# Patient Record
Sex: FEMALE | Race: WHITE | Employment: PART TIME
[De-identification: names, ages, dates, MRNs, and addresses within clinical notes are randomized per-mention and may not be internally consistent; named-entity substitution may affect disease eponyms.]

---

## 2017-06-17 ENCOUNTER — HEALTH MAINTENANCE LETTER (OUTPATIENT)
Age: 57
End: 2017-06-17

## 2017-11-14 ENCOUNTER — TRANSFERRED RECORDS (OUTPATIENT)
Dept: HEALTH INFORMATION MANAGEMENT | Facility: CLINIC | Age: 57
End: 2017-11-14

## 2017-11-14 LAB
ALT SERPL-CCNC: 13 U/L (ref 6–29)
AST SERPL-CCNC: 16 U/L (ref 10–35)
CHOLEST SERPL-MCNC: 256 MG/DL
CREAT SERPL-MCNC: 0.88 MG/DL (ref 0.5–1.05)
GFR SERPL CREATININE-BSD FRML MDRD: 73 ML/MIN/1.73M2
GLUCOSE SERPL-MCNC: 88 MG/DL (ref 65–99)
HDLC SERPL-MCNC: 73 MG/DL
LDLC SERPL CALC-MCNC: 163 MG/DL
NONHDLC SERPL-MCNC: 183 MG/DL
POTASSIUM SERPL-SCNC: 4.6 MMOL/L (ref 3.5–5.3)
TRIGL SERPL-MCNC: 94 MG/DL

## 2017-12-08 ENCOUNTER — RADIANT APPOINTMENT (OUTPATIENT)
Dept: MAMMOGRAPHY | Facility: CLINIC | Age: 57
End: 2017-12-08
Attending: OBSTETRICS & GYNECOLOGY
Payer: COMMERCIAL

## 2017-12-08 DIAGNOSIS — Z12.31 VISIT FOR SCREENING MAMMOGRAM: ICD-10-CM

## 2017-12-08 PROCEDURE — G0202 SCR MAMMO BI INCL CAD: HCPCS | Mod: TC

## 2018-02-15 ENCOUNTER — OFFICE VISIT (OUTPATIENT)
Dept: DERMATOLOGY | Facility: CLINIC | Age: 58
End: 2018-02-15
Payer: COMMERCIAL

## 2018-02-15 ENCOUNTER — TELEPHONE (OUTPATIENT)
Dept: DERMATOLOGY | Facility: CLINIC | Age: 58
End: 2018-02-15

## 2018-02-15 VITALS — HEART RATE: 91 BPM | OXYGEN SATURATION: 100 % | SYSTOLIC BLOOD PRESSURE: 134 MMHG | DIASTOLIC BLOOD PRESSURE: 93 MMHG

## 2018-02-15 DIAGNOSIS — D04.39 SQUAMOUS CELL CARCINOMA IN SITU OF SKIN OF FOREHEAD: Primary | ICD-10-CM

## 2018-02-15 DIAGNOSIS — L81.4 LENTIGO: ICD-10-CM

## 2018-02-15 DIAGNOSIS — D23.9 DERMAL NEVUS: ICD-10-CM

## 2018-02-15 DIAGNOSIS — C44.329 SQUAMOUS CELL CARCINOMA OF FOREHEAD: ICD-10-CM

## 2018-02-15 DIAGNOSIS — L82.1 SK (SEBORRHEIC KERATOSIS): ICD-10-CM

## 2018-02-15 PROCEDURE — 11100 HC BIOPSY SKIN/SUBQ/MUC MEM, SINGLE LESION: CPT | Performed by: DERMATOLOGY

## 2018-02-15 PROCEDURE — 99203 OFFICE O/P NEW LOW 30 MIN: CPT | Mod: 25 | Performed by: DERMATOLOGY

## 2018-02-15 PROCEDURE — 88331 PATH CONSLTJ SURG 1 BLK 1SPC: CPT | Performed by: DERMATOLOGY

## 2018-02-15 NOTE — PROGRESS NOTES
Sosa Seay is a 57 year old year old female patient here today for non healing spot on forehead for years .  Patient states this has been present for years.  Patient reports the following symptoms:  Not healing.  .  Patient reports the following previous treatments none.  Patient reports the following modifying factors none.  Associated symptoms: none.  Patient has no other skin complaints today.  Remainder of the HPI, Meds, PMH, Allergies, FH, and SH was reviewed in chart.      Past Medical History:   Diagnosis Date     Allergic rhinitis, cause unspecified     Allergic rhinitis       Past Surgical History:   Procedure Laterality Date     C NONSPECIFIC PROCEDURE      Reconstructive Surgery to Ears        Family History   Problem Relation Age of Onset     Hypertension Father      Skin Cancer Father      Depression Mother       age 59 depression killed self     Skin Cancer Paternal Grandmother        Social History     Social History     Marital status:      Spouse name: Elliott     Number of children: 0     Years of education: 16     Occupational History           food [employer]     Social History Main Topics     Smoking status: Never Smoker     Smokeless tobacco: Never Used     Alcohol use Yes      Comment: 3-4 glasses of wine per week     Drug use: No     Sexual activity: Yes     Partners: Male     Birth control/ protection: Surgical      Comment: Vasectomy     Other Topics Concern     Not on file     Social History Narrative       Outpatient Encounter Prescriptions as of 2/15/2018   Medication Sig Dispense Refill     CALCIUM + D PO None Entered       VALTREX 1 GM PO TABS 1 TABLET 3 TIMES DAILY 21 Tab 0     MULTIPLE VITAMINS CAPS   OR 1 capsule qd        CALCIUM 500 500 MG OR TABS 1 qd prn       No facility-administered encounter medications on file as of 2/15/2018.              Review Of Systems  Skin: As above  Eyes: negative  Ears/Nose/Throat: negative  Respiratory: No  shortness of breath, dyspnea on exertion, cough, or hemoptysis  Cardiovascular: negative  Gastrointestinal: negative  Genitourinary: negative  Musculoskeletal: negative  Neurologic: negative  Psychiatric: negative  Hematologic/Lymphatic/Immunologic: negative  Endocrine: negative      O:   NAD, WDWN, Alert & Oriented, Mood & Affect wnl, Vitals stable   Here today alone   BP (!) 134/93  Pulse 91  LMP 01/04/2012  SpO2 100%   General appearance normal   Vitals stable   Alert, oriented and in no acute distress      Following lymph nodes palpated: Occipital, Cervical, Supraclavicular no lad   Stuck on papules and brown macules on trunk and ext    Flesh colored papules on trunk and ext    r forehead 9mm eroded scaly papule           The remainder of the full exam was unremarkable; the following areas were examined:  conjunctiva/lids, oral mucosa, neck, peripheral vascular system, abdomen, lymph nodes, digits/nails, eccrine and apocrine glands, scalp/hair, face, neck, chest, abdomen, buttocks, back, RUE, LUE, RLE, LLE       Eyes: Conjunctivae/lids:Normal     ENT: Lips, buccal mucosa, tongue: normal    MSK:Normal    Cardiovascular: peripheral edema none    Pulm: Breathing Normal    Lymph Nodes: No Head and Neck Lymphadenopathy     Neuro/Psych: Orientation:Normal; Mood/Affect:Normal      MICRO:   R forehead:There is hyperkeratosis & parakeratosis of the epidermis, with full thickness epidermal involvement by atypical keratinocytes with rare pale vacuolated cells invading dermis.    A/P:  1. R forehead r/o squamous cell carcinoma   TANGENTIAL BIOPSY IN HOUSE:  After consent, anesthesia with LEC and prep, tangential excision performed and dx above confirmed with frozen section histology.  No complications and routine wound care.  Patient told result squamous cell carcinoma in situ/ squamous cell carcinoma schedule .    2. Seborrheic keratosis, letnigo, dermal nevus      BENIGN LESIONS DISCUSSED WITH PATIENT:  I discussed the  specifics of tumor, prognosis, and genetics of benign lesions.  I explained that treatment of these lesions would be purely cosmetic and not medically neccessary.  I discussed with patient different removal options including excision, cautery and /or laser.      Nature and genetics of benign skin lesions dicussed with patient.  Signs and Symptoms of skin cancer discussed with patient.  Patient encouraged to perform monthly skin exams.  UV precautions reviewed with patient.  Skin care regimen reviewed with patient: Eliminate harsh soaps, i.e. Dial, zest, irsih spring; Mild soaps such as Cetaphil or Dove sensitive skin, avoid hot or cold showers, aggressive use of emollients including vanicream, cetaphil or cerave discussed with patient.    Risks of non-melanoma skin cancer discussed with patient   Return to clinic next appt

## 2018-02-15 NOTE — LETTER
[facility]  [address]  [address]  [phone]        2/16/2018       Sosa Seay  [address] [address] [address]      Dear Sosa:    You are scheduled for Mohs Surgery on: Thursday, April 5th 2018 at 7:00am .    Please check in at [facility], [address].     You don't need to arrive more than 5-10 minutes prior to your appointment time.     Be sure to eat a good breakfast and bathe and wash your hair prior to surgery.     If you are taking any anti-coagulants that are prescribed by your Doctor (such as Coumadin/Warfarin, Plavix, Aspirin, Ibuprofen), please continue taking them.     However, if you are taking anti-coagulants over the counter without a Doctor's order for a medical condition, please discontinue them 10 days prior to surgery.   Please have a  to and from the clinic  Please wear loose comfortable clothing as it could possibly be 4-6 hours until your surgery is completed depending upon how many layers of tissue need to be removed.      Thank you,    POPEYE Rajan MD

## 2018-02-15 NOTE — TELEPHONE ENCOUNTER
Juaquin Rajan MD  [facility]                   R forehead squamous cell carcinoma in situ / squamous cell carcinoma schedule excision

## 2018-02-15 NOTE — LETTER
2/15/2018         RE: Sosa Seay  [address] [facility] [address]        Dear Colleague,    Thank you for referring your patient, Sosa Seay, to the [facility]. Please see a copy of my visit note below.    Sosa Seay is a 57 year old year old female patient here today for non healing spot on forehead for years .  Patient states this has been present for years.  Patient reports the following symptoms:  Not healing.  .  Patient reports the following previous treatments none.  Patient reports the following modifying factors none.  Associated symptoms: none.  Patient has no other skin complaints today.  Remainder of the HPI, Meds, PMH, Allergies, FH, and SH was reviewed in chart.      Past Medical History:   Diagnosis Date     Allergic rhinitis, cause unspecified     Allergic rhinitis       Past Surgical History:   Procedure Laterality Date     C NONSPECIFIC PROCEDURE      Reconstructive Surgery to Ears        Family History   Problem Relation Age of Onset     Hypertension Father      Skin Cancer Father      Depression Mother       age 59 depression killed self     Skin Cancer Paternal Grandmother        Social History     Social History     Marital status:      Spouse name: Elliott     Number of children: 0     Years of education: 16     Occupational History           food [employer]     Social History Main Topics     Smoking status: Never Smoker     Smokeless tobacco: Never Used     Alcohol use Yes      Comment: 3-4 glasses of wine per week     Drug use: No     Sexual activity: Yes     Partners: Male     Birth control/ protection: Surgical      Comment: Vasectomy     Other Topics Concern     Not on file     Social History Narrative       Outpatient Encounter Prescriptions as of 2/15/2018   Medication Sig Dispense Refill     CALCIUM + D PO None Entered       VALTREX 1 GM PO TABS 1 TABLET 3 TIMES DAILY 21 Tab 0     MULTIPLE  VITAMINS CAPS   OR 1 capsule qd        CALCIUM 500 500 MG OR TABS 1 qd prn       No facility-administered encounter medications on file as of 2/15/2018.              Review Of Systems  Skin: As above  Eyes: negative  Ears/Nose/Throat: negative  Respiratory: No shortness of breath, dyspnea on exertion, cough, or hemoptysis  Cardiovascular: negative  Gastrointestinal: negative  Genitourinary: negative  Musculoskeletal: negative  Neurologic: negative  Psychiatric: negative  Hematologic/Lymphatic/Immunologic: negative  Endocrine: negative      O:   NAD, WDWN, Alert & Oriented, Mood & Affect wnl, Vitals stable   Here today alone   BP (!) 134/93  Pulse 91  LMP 01/04/2012  SpO2 100%   General appearance normal   Vitals stable   Alert, oriented and in no acute distress      Following lymph nodes palpated: Occipital, Cervical, Supraclavicular no lad   Stuck on papules and brown macules on trunk and ext    Flesh colored papules on trunk and ext    r forehead 9mm eroded scaly papule           The remainder of the full exam was unremarkable; the following areas were examined:  conjunctiva/lids, oral mucosa, neck, peripheral vascular system, abdomen, lymph nodes, digits/nails, eccrine and apocrine glands, scalp/hair, face, neck, chest, abdomen, buttocks, back, RUE, LUE, RLE, LLE       Eyes: Conjunctivae/lids:Normal     ENT: Lips, buccal mucosa, tongue: normal    MSK:Normal    Cardiovascular: peripheral edema none    Pulm: Breathing Normal    Lymph Nodes: No Head and Neck Lymphadenopathy     Neuro/Psych: Orientation:Normal; Mood/Affect:Normal      MICRO:   R forehead:There is hyperkeratosis & parakeratosis of the epidermis, with full thickness epidermal involvement by atypical keratinocytes with rare pale vacuolated cells invading dermis.    A/P:  1. R forehead r/o squamous cell carcinoma   TANGENTIAL BIOPSY IN HOUSE:  After consent, anesthesia with LEC and prep, tangential excision performed and dx above confirmed with frozen  section histology.  No complications and routine wound care.  Patient told result squamous cell carcinoma in situ/ squamous cell carcinoma schedule .    2. Seborrheic keratosis, letnigo, dermal nevus      BENIGN LESIONS DISCUSSED WITH PATIENT:  I discussed the specifics of tumor, prognosis, and genetics of benign lesions.  I explained that treatment of these lesions would be purely cosmetic and not medically neccessary.  I discussed with patient different removal options including excision, cautery and /or laser.      Nature and genetics of benign skin lesions dicussed with patient.  Signs and Symptoms of skin cancer discussed with patient.  Patient encouraged to perform monthly skin exams.  UV precautions reviewed with patient.  Skin care regimen reviewed with patient: Eliminate harsh soaps, i.e. Dial, zest, irsih spring; Mild soaps such as Cetaphil or Dove sensitive skin, avoid hot or cold showers, aggressive use of emollients including vanicream, cetaphil or cerave discussed with patient.    Risks of non-melanoma skin cancer discussed with patient   Return to clinic next appt      Again, thank you for allowing me to participate in the care of your patient.        Sincerely,        Juaquin Rajan MD

## 2018-02-15 NOTE — NURSING NOTE
"Chief Complaint   Patient presents with     Skin Check       Initial BP (!) 134/93  Pulse 91  SpO2 100% Estimated body mass index is 23.21 kg/(m^2) as calculated from the following:    Height as of 1/26/12: 1.6 m (5' 3\").    Weight as of 1/26/12: 59.4 kg (131 lb).  Medication Reconciliation: complete    "

## 2018-02-15 NOTE — MR AVS SNAPSHOT
After Visit Summary   2/15/2018    Sosa Seay    MRN: [id number]           Patient Information     Date Of Birth          1960        Visit Information        Provider Department      2/15/2018 9:30 AM Juaquin Rajan MD [facility]        Care Instructions          Wound Care Instructions     FOR SUPERFICIAL WOUNDS     [facility] [phone]    [facility] [phone]                       AFTER 24 HOURS YOU SHOULD REMOVE THE BANDAGE AND BEGIN DAILY DRESSING CHANGES AS FOLLOWS:     1) Remove Dressing.     2) Clean and dry the area with tap water using a Q-tip or sterile gauze pad.     3) Apply Vaseline, Aquaphor, Polysporin ointment or Bacitracin ointment over entire wound.  Do NOT use Neosporin ointment.     4) Cover the wound with a band-aid, or a sterile non-stick gauze pad and micropore paper tape      REPEAT THESE INSTRUCTIONS AT LEAST ONCE A DAY UNTIL THE WOUND HAS COMPLETELY HEALED.    It is an old wives tale that a wound heals better when it is exposed to air and allowed to dry out. The wound will heal faster with a better cosmetic result if it is kept moist with ointment and covered with a bandage.    **Do not let the wound dry out.**      Supplies Needed:      *Cotton tipped applicators (Q-tips)    *Polysporin Ointment or Bacitracin Ointment (NOT NEOSPORIN)    *Band-aids or non-stick gauze pads and micropore paper tape.      PATIENT INFORMATION:    During the healing process you will notice a number of changes. All wounds develop a small halo of redness surrounding the wound.  This means healing is occurring. Severe itching with extensive redness usually indicates sensitivity to the ointment or bandage tape used to dress the wound.  You should call our office if this develops.      Swelling  and/or discoloration around your surgical site is common, particularly when performed around the eye.    All wounds normally drain.   The larger the wound the more drainage there will be.  After 7-10 days, you will notice the wound beginning to shrink and new skin will begin to grow.  The wound is healed when you can see skin has formed over the entire area.  A healed wound has a healthy, shiny look to the surface and is red to dark pink in color to normalize.  Wounds may take approximately 4-6 weeks to heal.  Larger wounds may take 6-8 weeks.  After the wound is healed you may discontinue dressing changes.    You may experience a sensation of tightness as your wound heals. This is normal and will gradually subside.    Your healed wound may be sensitive to temperature changes. This sensitivity improves with time, but if you re having a lot of discomfort, try to avoid temperature extremes.    Patients frequently experience itching after their wound appears to have healed because of the continue healing under the skin.  Plain Vaseline will help relieve the itching.        POSSIBLE COMPLICATIONS    BLEEDIN. Leave the bandage in place.  2. Use tightly rolled up gauze or a cloth to apply direct pressure over the bandage for 30  minutes.  3. Reapply pressure for an additional 30 minutes if necessary  4. Use additional gauze and tape to maintain pressure once the bleeding has stopped.            Follow-ups after your visit        Who to contact     If you have questions or need follow up information about today's clinic visit or your schedule please contact [facility] directly at [phone].  Normal or non-critical lab and imaging results will be communicated to you by [employer], letter or phone within 4 business days after the clinic has received the results. If you do not hear from us within 7 days, please contact the clinic through [employer] or phone. If you have a critical or abnormal lab result, we will notify you by phone as soon as possible.  Submit refill requests through [employer] or call your pharmacy and they will  "forward the refill request to us. Please allow 3 business days for your refill to be completed.          Additional Information About Your Visit        MyChart Information     [employer] lets you send messages to your doctor, view your test results, renew your prescriptions, schedule appointments and more. To sign up, go to [email or web address] . Click on \"Log in\" on the left side of the screen, which will take you to the Welcome page. Then click on \"Sign up Now\" on the right side of the page.     You will be asked to enter the access code listed below, as well as some personal information. Please follow the directions to create your username and password.     Your access code is: [email or web address]  Expires: 2018 10:04 AM     Your access code will  in 90 days. If you need help or a new code, please call your [facility] clinic or [phone].        Care EveryWhere ID     This is your Care EveryWhere ID. This could be used by other organizations to access your [facility] medical records  [id number]        Your Vitals Were     Pulse Last Period Pulse Oximetry             91 2012 100%          Blood Pressure from Last 3 Encounters:   02/15/18 (!) 134/93   12 132/80   06/17/10 152/98    Weight from Last 3 Encounters:   12 59.4 kg (131 lb)   06/17/10 59.9 kg (132 lb)   09 57.2 kg (126 lb)              Today, you had the following     No orders found for display       Primary Care Provider Office Phone # Fax #    Ambrocio Ball [phone]       [facility] [address]  [place] [address]        Equal Access to Services     [place] GO : Jeff Ackerman, mariah pina, pastor lovelace, renetta pederson. So [facility] [phone].    ATENCIÓN: Si habla español, tiene a pineda disposición servicios gratuitos de asistencia lingüística. Llame al [phone].    We comply with applicable federal civil rights laws " and [place] laws. We do not discriminate on the basis of race, color, national origin, age, disability, sex, sexual orientation, or gender identity.            Thank you!     Thank you for choosing [facility]  for your care. Our goal is always to provide you with excellent care. Hearing back from our patients is one way we can continue to improve our services. Please take a few minutes to complete the written survey that you may receive in the mail after your visit with us. Thank you!             Your Updated Medication List - Protect others around you: Learn how to safely use, store and throw away your medicines at www.disposemymeds.org.          This list is accurate as of 2/15/18 10:04 AM.  Always use your most recent med list.                   Brand Name Dispense Instructions for use Diagnosis    CALCIUM + D PO      None Entered        calcium 500 1250 (500 CA) MG Tabs tablet   Generic drug:  calcium carbonate      1 qd prn        MULTIPLE VITAMINS CAPS   OR      1 capsule qd        VALTREX 1000 mg tablet   Generic drug:  valACYclovir     21 Tab    1 TABLET 3 TIMES DAILY    Paresthesias

## 2018-02-15 NOTE — TELEPHONE ENCOUNTER
Left message with spouse to have patient call back.    Aubrie NAJERA RN  [facility]  [phone]  [facility]   [phone]

## 2018-02-15 NOTE — PATIENT INSTRUCTIONS
Wound Care Instructions     FOR SUPERFICIAL WOUNDS     [facility] [phone]    [facility] [phone]                       AFTER 24 HOURS YOU SHOULD REMOVE THE BANDAGE AND BEGIN DAILY DRESSING CHANGES AS FOLLOWS:     1) Remove Dressing.     2) Clean and dry the area with tap water using a Q-tip or sterile gauze pad.     3) Apply Vaseline, Aquaphor, Polysporin ointment or Bacitracin ointment over entire wound.  Do NOT use Neosporin ointment.     4) Cover the wound with a band-aid, or a sterile non-stick gauze pad and micropore paper tape      REPEAT THESE INSTRUCTIONS AT LEAST ONCE A DAY UNTIL THE WOUND HAS COMPLETELY HEALED.    It is an old wives tale that a wound heals better when it is exposed to air and allowed to dry out. The wound will heal faster with a better cosmetic result if it is kept moist with ointment and covered with a bandage.    **Do not let the wound dry out.**      Supplies Needed:      *Cotton tipped applicators (Q-tips)    *Polysporin Ointment or Bacitracin Ointment (NOT NEOSPORIN)    *Band-aids or non-stick gauze pads and micropore paper tape.      PATIENT INFORMATION:    During the healing process you will notice a number of changes. All wounds develop a small halo of redness surrounding the wound.  This means healing is occurring. Severe itching with extensive redness usually indicates sensitivity to the ointment or bandage tape used to dress the wound.  You should call our office if this develops.      Swelling  and/or discoloration around your surgical site is common, particularly when performed around the eye.    All wounds normally drain.  The larger the wound the more drainage there will be.  After 7-10 days, you will notice the wound beginning to shrink and new skin will begin to grow.  The wound is healed when you can see skin has formed over the entire area.  A healed wound has a healthy, shiny look to the surface and is red to dark pink in color  to normalize.  Wounds may take approximately 4-6 weeks to heal.  Larger wounds may take 6-8 weeks.  After the wound is healed you may discontinue dressing changes.    You may experience a sensation of tightness as your wound heals. This is normal and will gradually subside.    Your healed wound may be sensitive to temperature changes. This sensitivity improves with time, but if you re having a lot of discomfort, try to avoid temperature extremes.    Patients frequently experience itching after their wound appears to have healed because of the continue healing under the skin.  Plain Vaseline will help relieve the itching.        POSSIBLE COMPLICATIONS    BLEEDIN. Leave the bandage in place.  2. Use tightly rolled up gauze or a cloth to apply direct pressure over the bandage for 30  minutes.  3. Reapply pressure for an additional 30 minutes if necessary  4. Use additional gauze and tape to maintain pressure once the bleeding has stopped.

## 2018-02-16 NOTE — TELEPHONE ENCOUNTER
patient notified of test results- MOHS procedure explained-appointment scheduled- letter mailed.    Aubrie NAJERA RN  [facility]  [phone]  [facility]   [phone]

## 2018-04-05 ENCOUNTER — OFFICE VISIT (OUTPATIENT)
Dept: DERMATOLOGY | Facility: CLINIC | Age: 58
End: 2018-04-05
Payer: COMMERCIAL

## 2018-04-05 VITALS — HEART RATE: 104 BPM | OXYGEN SATURATION: 100 % | SYSTOLIC BLOOD PRESSURE: 130 MMHG | DIASTOLIC BLOOD PRESSURE: 88 MMHG

## 2018-04-05 DIAGNOSIS — C44.329 SQUAMOUS CELL CARCINOMA OF FOREHEAD: Primary | ICD-10-CM

## 2018-04-05 PROCEDURE — 17311 MOHS 1 STAGE H/N/HF/G: CPT | Performed by: DERMATOLOGY

## 2018-04-05 NOTE — NURSING NOTE
Surgical Office Location:  [facility]  [address] [place] [id number]

## 2018-04-05 NOTE — NURSING NOTE
"Initial /88  Pulse 104  LMP 01/04/2012  SpO2 100% Estimated body mass index is 23.21 kg/(m^2) as calculated from the following:    Height as of 1/26/12: 1.6 m (5' 3\").    Weight as of 1/26/12: 59.4 kg (131 lb). .      "

## 2018-04-05 NOTE — LETTER
2018         RE: Sosa Seay  [address] [facility] [address]        Dear Colleague,    Thank you for referring your patient, Sosa Seay, to the [facility]. Please see a copy of my visit note below.    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  Associated symptoms: none.  Patient has no other skin complaints today.  Remainder of the HPI, Meds, PMH, Allergies, FH, and SH was reviewed in chart.      Past Medical History:   Diagnosis Date     Allergic rhinitis, cause unspecified     Allergic rhinitis     Squamous cell carcinoma        Past Surgical History:   Procedure Laterality Date     C NONSPECIFIC PROCEDURE      Reconstructive Surgery to Ears        Family History   Problem Relation Age of Onset     Hypertension Father      Skin Cancer Father      Depression Mother       age 59 depression killed self     Skin Cancer Paternal Grandmother        Social History     Social History     Marital status:      Spouse name: Elliott     Number of children: 0     Years of education: 16     Occupational History           food shelf     Social History Main Topics     Smoking status: Never Smoker     Smokeless tobacco: Never Used     Alcohol use Yes      Comment: 3-4 glasses of wine per week     Drug use: No     Sexual activity: Yes     Partners: Male     Birth control/ protection: Surgical      Comment: Vasectomy     Other Topics Concern     Not on file     Social History Narrative       Outpatient Encounter Prescriptions as of 2018   Medication Sig Dispense Refill     CALCIUM + D PO None Entered       VALTREX 1 GM PO TABS 1 TABLET 3 TIMES DAILY 21 Tab 0     MULTIPLE VITAMINS CAPS   OR 1 capsule qd        CALCIUM 500 500 MG OR TABS 1 qd prn       No facility-administered encounter medications on file as of 2018.               Review Of Systems  Skin: As above  Eyes: negative  Ears/Nose/Throat: negative  Respiratory: No shortness of breath, dyspnea on exertion, cough, or hemoptysis  Cardiovascular: negative  Gastrointestinal: negative  Genitourinary: negative  Musculoskeletal: negative  Neurologic: negative  Psychiatric: negative  Hematologic/Lymphatic/Immunologic: negative  Endocrine: negative      O:   NAD, WDWN, Alert & Oriented, Mood & Affect wnl, Vitals stable   Here today alone   /88  Pulse 104  LMP 01/04/2012  SpO2 100%   General appearance normal   Vitals stable   Alert, oriented and in no acute distress      Following lymph nodes palpated: Occipital, Cervical, Supraclavicular no lad   r forehead 9mm scaly papule       Eyes: Conjunctivae/lids:Normal     ENT: Lips, buccal mucosa, tongue: normal    MSK:Normal    Cardiovascular: peripheral edema none    Pulm: Breathing Normal    Lymph Nodes: No Head and Neck Lymphadenopathy     Neuro/Psych: Orientation:Normal; Mood/Affect:Normal      A/P:  1. R FH squamous cell carcinoma   MOHS:   Location    After PGACAC discussed with patient, decision for Mohs surgery was made. Indication for Mohs was Location. Patient confirmed the site with Dr. Rajan.  After anesthesia with LEC, the tumor was excised using standard Mohs technique in 1 stages(s).  CLEAR MARGINS OBTAINED and Final defect size was 1.2 cm.       REPAIR SECOND INTENT: We discussed the options for wound management in full with the patient including risks/benefits/possible outcomes. Decision made to allow the wound to heal by second intention. EBL minimal; complications none; wound care routine.  The patient was discharged in good condition and will return in one month or prn for wound evaluation.    BENIGN LESIONS DISCUSSED WITH PATIENT:  I discussed the specifics of tumor, prognosis, and genetics of benign lesions.  I explained that treatment of these lesions would be purely cosmetic and not medically  neccessary.  I discussed with patient different removal options including excision, cautery and /or laser.      Nature and genetics of benign skin lesions dicussed with patient.  Signs and Symptoms of skin cancer discussed with patient.  Patient encouraged to perform monthly skin exams.  UV precautions reviewed with patient.  Patient to follow up with Primary Care provider regarding elevated blood pressure.  Skin care regimen reviewed with patient: Eliminate harsh soaps, i.e. Dial, zest, irsih spring; Mild soaps such as Cetaphil or Dove sensitive skin, avoid hot or cold showers, aggressive use of emollients including vanicream, cetaphil or cerave discussed with patient.    Risks of non-melanoma skin cancer discussed with patient   Return to clinic 6 months      Again, thank you for allowing me to participate in the care of your patient.        Sincerely,        Juaquin Rajan MD

## 2018-04-05 NOTE — MR AVS SNAPSHOT
After Visit Summary   2018    Sosa Seay    MRN: [id number]           Patient Information     Date Of Birth          1960        Visit Information        Provider Department      2018 7:00 AM Juaquin Rajan MD [facility]        Today's Diagnoses     Squamous cell carcinoma of forehead    -  1      Care Instructions    Open Wound Care     for right forehead        ? No strenuous activity for 48 hours    ? Take Tylenol as needed for discomfort.                                                .         ? Do not drink alcoholic beverages for 48 hours.    ? Keep the pressure bandage in place for 24 hours. If the bandage becomes blood tinged or loose, reinforce it with gauze and tape.        (Refer to the reverse side of this page for management of bleeding).    ? Remove bandage in 24 hours and begin wound care as follows:     1. Clean area with tap water using a Q tip or gauze pad, (shower / bathe normally)  2. Dry wound with Q tip or gauze pad  3. Apply Aquaphor, Vaseline, Polysporin or Bacitracin Ointment with a Q tip    Do NOT use Neosporin Ointment *  4. Cover the wound with a band-aid or nonstick gauze pad and paper tape.  5. Repeat wound care once a day until wound is completely healed.    It is an old wives tale that a wound heals better when it is exposed to air and allowed to dry out. The wound will heal faster with a better cosmetic result if it is kept moist with ointment and covered with a bandage.  Do not let the wound dry out.      Supplies Needed:                Qtips or gauze pads                Polysporin or Bacitracin Ointment                Bandaids or nonstick gauze pads and paper tape    Wound care kits and brown paper tape are available for purchase at   the pharmacy.    BLEEDIN. Use tightly rolled up gauze or cloth to apply direct pressure over the bandage for 20   minutes.  2. Reapply pressure for an additional 20 minutes if  necessary  3. Call the office or go to the nearest emergency room if pressure fails to stop the bleeding.  4. Use additional gauze and tape to maintain pressure once the bleeding has stopped.  5. Begin wound care 24 hours after surgery as directed.                  WOUND HEALING    1. One week after surgery a pink / red halo will form around the outside of the wound.   This is new skin.  2. The center of the wound will appear yellowish white and produce some drainage.  3. The pink halo will slowly migrate in toward the center of the wound until the wound is covered with new shiny pink skin.  4. There will be no more drainage when the wound is completely healed.  5. It will take six months to one year for the redness to fade.  6. The scar may be itchy, tight and sensitive to extreme temperatures for a year after the surgery.  7. Massaging the area several times a day for several minutes after the wound is completely healed will help the scar soften and normalize faster. Begin massage only after healing is complete.      In case of emergency call: Dr Rajan: [phone]     [facility]: [phone]    [facility]: [phone]            Follow-ups after your visit        Who to contact     If you have questions or need follow up information about today's clinic visit or your schedule please contact [facility] directly at [phone].  Normal or non-critical lab and imaging results will be communicated to you by MyChart, letter or phone within 4 business days after the clinic has received the results. If you do not hear from us within 7 days, please contact the clinic through MyChart or phone. If you have a critical or abnormal lab result, we will notify you by phone as soon as possible.  Submit refill requests through [employer] or call your pharmacy and they will forward the refill request to us. Please allow 3 business days for your refill to be completed.           "Additional Information About Your Visit        MyChart Information     [employer] lets you send messages to your doctor, view your test results, renew your prescriptions, schedule appointments and more. To sign up, go to [email or web address] . Click on \"Log in\" on the left side of the screen, which will take you to the Welcome page. Then click on \"Sign up Now\" on the right side of the page.     You will be asked to enter the access code listed below, as well as some personal information. Please follow the directions to create your username and password.     Your access code is: [email or web address]  Expires: 2018 11:04 AM     Your access code will  in 90 days. If you need help or a new code, please call your [place] clinic or [phone].        Care EveryWhere ID     This is your Care EveryWhere ID. This could be used by other organizations to access your [place] medical records  [id number]        Your Vitals Were     Pulse Last Period Pulse Oximetry             104 2012 100%          Blood Pressure from Last 3 Encounters:   18 130/88   02/15/18 (!) 134/93   12 132/80    Weight from Last 3 Encounters:   12 59.4 kg (131 lb)   06/17/10 59.9 kg (132 lb)   09 57.2 kg (126 lb)              We Performed the Following     MOHS HEAD/NCK/HND/FT/GEN 1ST STAGE UP T0 5 BLOCKS        Primary Care Provider Office Phone # Fax #    Ambrocio Ball [phone]       [facility] [address]  [place] [address]        Equal Access to Services     SANFORD GO : Hadii karen Ackerman, waaxda luqadaha, qaybta kaalmada [place], renetta pederson. So [facility] [phone].    ATENCIÓN: Si habla español, tiene a pineda disposición servicios gratuitos de asistencia lingüística. Llame al [phone].    We comply with applicable federal civil rights laws and [place] laws. We do not discriminate on the basis of race, color, national " origin, age, disability, sex, sexual orientation, or gender identity.            Thank you!     Thank you for choosing [facility]  for your care. Our goal is always to provide you with excellent care. Hearing back from our patients is one way we can continue to improve our services. Please take a few minutes to complete the written survey that you may receive in the mail after your visit with us. Thank you!             Your Updated Medication List - Protect others around you: Learn how to safely use, store and throw away your medicines at www.disposemymeds.org.          This list is accurate as of 4/5/18  8:02 AM.  Always use your most recent med list.                   Brand Name Dispense Instructions for use Diagnosis    CALCIUM + D PO      None Entered        calcium 500 1250 (500 CA) MG Tabs tablet   Generic drug:  calcium carbonate      1 qd prn        MULTIPLE VITAMINS CAPS   OR      1 capsule qd        VALTREX 1000 mg tablet   Generic drug:  valACYclovir     21 Tab    1 TABLET 3 TIMES DAILY    Paresthesias

## 2018-04-05 NOTE — PATIENT INSTRUCTIONS
Open Wound Care     for right forehead        ? No strenuous activity for 48 hours    ? Take Tylenol as needed for discomfort.                                                .         ? Do not drink alcoholic beverages for 48 hours.    ? Keep the pressure bandage in place for 24 hours. If the bandage becomes blood tinged or loose, reinforce it with gauze and tape.        (Refer to the reverse side of this page for management of bleeding).    ? Remove bandage in 24 hours and begin wound care as follows:     1. Clean area with tap water using a Q tip or gauze pad, (shower / bathe normally)  2. Dry wound with Q tip or gauze pad  3. Apply Aquaphor, Vaseline, Polysporin or Bacitracin Ointment with a Q tip    Do NOT use Neosporin Ointment *  4. Cover the wound with a band-aid or nonstick gauze pad and paper tape.  5. Repeat wound care once a day until wound is completely healed.    It is an old wives tale that a wound heals better when it is exposed to air and allowed to dry out. The wound will heal faster with a better cosmetic result if it is kept moist with ointment and covered with a bandage.  Do not let the wound dry out.      Supplies Needed:                Qtips or gauze pads                Polysporin or Bacitracin Ointment                Bandaids or nonstick gauze pads and paper tape    Wound care kits and brown paper tape are available for purchase at   the pharmacy.    BLEEDIN. Use tightly rolled up gauze or cloth to apply direct pressure over the bandage for 20   minutes.  2. Reapply pressure for an additional 20 minutes if necessary  3. Call the office or go to the nearest emergency room if pressure fails to stop the bleeding.  4. Use additional gauze and tape to maintain pressure once the bleeding has stopped.  5. Begin wound care 24 hours after surgery as directed.                  WOUND HEALING    1. One week after surgery a pink / red halo will form around the outside of the wound.   This is new  skin.  2. The center of the wound will appear yellowish white and produce some drainage.  3. The pink halo will slowly migrate in toward the center of the wound until the wound is covered with new shiny pink skin.  4. There will be no more drainage when the wound is completely healed.  5. It will take six months to one year for the redness to fade.  6. The scar may be itchy, tight and sensitive to extreme temperatures for a year after the surgery.  7. Massaging the area several times a day for several minutes after the wound is completely healed will help the scar soften and normalize faster. Begin massage only after healing is complete.      In case of emergency call: Dr Rajan: [phone]     [facility]: [phone]    [facility]: [phone]

## 2018-04-05 NOTE — PROGRESS NOTES
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  Associated symptoms: none.  Patient has no other skin complaints today.  Remainder of the HPI, Meds, PMH, Allergies, FH, and SH was reviewed in chart.      Past Medical History:   Diagnosis Date     Allergic rhinitis, cause unspecified     Allergic rhinitis     Squamous cell carcinoma        Past Surgical History:   Procedure Laterality Date     C NONSPECIFIC PROCEDURE  1973    Reconstructive Surgery to Ears        Family History   Problem Relation Age of Onset     Hypertension Father      Skin Cancer Father      Depression Mother       age 59 depression killed self     Skin Cancer Paternal Grandmother        Social History     Social History     Marital status:      Spouse name: Elliott     Number of children: 0     Years of education: 16     Occupational History           food shelf     Social History Main Topics     Smoking status: Never Smoker     Smokeless tobacco: Never Used     Alcohol use Yes      Comment: 3-4 glasses of wine per week     Drug use: No     Sexual activity: Yes     Partners: Male     Birth control/ protection: Surgical      Comment: Vasectomy     Other Topics Concern     Not on file     Social History Narrative       Outpatient Encounter Prescriptions as of 2018   Medication Sig Dispense Refill     CALCIUM + D PO None Entered       VALTREX 1 GM PO TABS 1 TABLET 3 TIMES DAILY 21 Tab 0     MULTIPLE VITAMINS CAPS   OR 1 capsule qd        CALCIUM 500 500 MG OR TABS 1 qd prn       No facility-administered encounter medications on file as of 2018.              Review Of Systems  Skin: As above  Eyes: negative  Ears/Nose/Throat: negative  Respiratory: No shortness of breath, dyspnea on exertion, cough, or hemoptysis  Cardiovascular: negative  Gastrointestinal: negative  Genitourinary: negative  Musculoskeletal:  negative  Neurologic: negative  Psychiatric: negative  Hematologic/Lymphatic/Immunologic: negative  Endocrine: negative      O:   NAD, WDWN, Alert & Oriented, Mood & Affect wnl, Vitals stable   Here today alone   /88  Pulse 104  LMP 01/04/2012  SpO2 100%   General appearance normal   Vitals stable   Alert, oriented and in no acute distress      Following lymph nodes palpated: Occipital, Cervical, Supraclavicular no lad   r forehead 9mm scaly papule       Eyes: Conjunctivae/lids:Normal     ENT: Lips, buccal mucosa, tongue: normal    MSK:Normal    Cardiovascular: peripheral edema none    Pulm: Breathing Normal    Lymph Nodes: No Head and Neck Lymphadenopathy     Neuro/Psych: Orientation:Normal; Mood/Affect:Normal      A/P:  1. R FH squamous cell carcinoma   MOHS:   Location    After PGACAC discussed with patient, decision for Mohs surgery was made. Indication for Mohs was Location. Patient confirmed the site with Dr. Rajan.  After anesthesia with LEC, the tumor was excised using standard Mohs technique in 1 stages(s).  CLEAR MARGINS OBTAINED and Final defect size was 1.2 cm.       REPAIR SECOND INTENT: We discussed the options for wound management in full with the patient including risks/benefits/possible outcomes. Decision made to allow the wound to heal by second intention. EBL minimal; complications none; wound care routine.  The patient was discharged in good condition and will return in one month or prn for wound evaluation.    BENIGN LESIONS DISCUSSED WITH PATIENT:  I discussed the specifics of tumor, prognosis, and genetics of benign lesions.  I explained that treatment of these lesions would be purely cosmetic and not medically neccessary.  I discussed with patient different removal options including excision, cautery and /or laser.      Nature and genetics of benign skin lesions dicussed with patient.  Signs and Symptoms of skin cancer discussed with patient.  Patient encouraged to perform monthly  skin exams.  UV precautions reviewed with patient.  Patient to follow up with Primary Care provider regarding elevated blood pressure.  Skin care regimen reviewed with patient: Eliminate harsh soaps, i.e. Dial, zest, irsih spring; Mild soaps such as Cetaphil or Dove sensitive skin, avoid hot or cold showers, aggressive use of emollients including vanicream, cetaphil or cerave discussed with patient.    Risks of non-melanoma skin cancer discussed with patient   Return to clinic 6 months

## 2018-05-14 ENCOUNTER — OFFICE VISIT (OUTPATIENT)
Dept: OBGYN | Facility: CLINIC | Age: 58
End: 2018-05-14
Payer: COMMERCIAL

## 2018-05-14 VITALS
BODY MASS INDEX: 25.68 KG/M2 | WEIGHT: 144.9 LBS | HEART RATE: 80 BPM | SYSTOLIC BLOOD PRESSURE: 122 MMHG | DIASTOLIC BLOOD PRESSURE: 80 MMHG | HEIGHT: 63 IN

## 2018-05-14 DIAGNOSIS — Z00.00 ENCOUNTER FOR ROUTINE ADULT HEALTH EXAMINATION WITHOUT ABNORMAL FINDINGS: Primary | ICD-10-CM

## 2018-05-14 DIAGNOSIS — Z12.4 ENCOUNTER FOR SCREENING FOR CERVICAL CANCER: ICD-10-CM

## 2018-05-14 PROCEDURE — 87624 HPV HI-RISK TYP POOLED RSLT: CPT | Performed by: OBSTETRICS & GYNECOLOGY

## 2018-05-14 PROCEDURE — 99386 PREV VISIT NEW AGE 40-64: CPT | Performed by: OBSTETRICS & GYNECOLOGY

## 2018-05-14 PROCEDURE — G0145 SCR C/V CYTO,THINLAYER,RESCR: HCPCS | Performed by: OBSTETRICS & GYNECOLOGY

## 2018-05-14 NOTE — NURSING NOTE
"  Chief Complaint   Patient presents with     Physical     due for pap, last pap was 01/26/12- NIL       Initial /80 (BP Location: Right arm, Patient Position: Chair, Cuff Size: Adult Regular)  Pulse 80  Ht 5' 3\" (1.6 m)  Wt 144 lb 14.4 oz (65.7 kg)  LMP 01/04/2012  BMI 25.67 kg/m2 Estimated body mass index is 25.67 kg/(m^2) as calculated from the following:    Height as of this encounter: 5' 3\" (1.6 m).    Weight as of this encounter: 144 lb 14.4 oz (65.7 kg).  Medication Reconciliation: complete      Melany Catalan CMA      "

## 2018-05-14 NOTE — PATIENT INSTRUCTIONS
Preventive Health Recommendations  Female Ages 40-64  Yearly exam:    See your health care provider every year in order to    Review health changes.      Discuss preventive care.       Review your medicines if you your doctor has prescribed any.    Pap Smears: You should have a Pap test every 3 years or have a Pap test with HPV screening every 5 years.    You do not need a Pap test if your uterus was removed (hysterectomy) and you have not had cancer.   Breast Cancer Screening: You should begin mammography for breast cancer screening by age 40 or 50 depending on your personal risks and your doctors recommendation. Screening should occur every year or every other year based on your discussion with your doctor.  Colorectal Cancer Screening: Starting at age 50 you need to undergo colonoscopy (every 5-10 years) or other colon cancer screening.    Health Maintenance:  - Your cholesterol should be checked every 5 years, more often if you have increased risk factors such as diabetes, high blood pressure, tobacco use, obesity, or a strong family history of cardiovascular disease before age 50 in men and 60 in women  - If you are at risk for diabetes due to being overweight or obese, having a strong family history, or having a history of gestational diabetes you should have a diabetes test (fasting glucose or Hemoglobin A1c level) every 3 years.  Shots: Get a flu shot each year. Get a tetanus shot every 10 years.    Nutrition:      Eat at least 5 servings of fruits and vegetables each day.    Eat whole-grain bread, whole-wheat pasta, faro, quinoa, barley and brown rice instead of white grains and rice.    Consume 1000mg of Calcium and 1000u of Vitamin D daily. If these are not in your regular diet you should take a supplement.    To calculate the calcium in your food add a zero to the percent found on the packaging (ex: 30% = 300mg of calcium per serving)    Good sources of Calcium include:    Low-fat dairy products (200 to  300 milligrams per serving)      Dark green leafy vegetables     Canned salmon or sardines with bones     Soy products, such as tofu     Calcium-fortified cereals and orange juice    Osteopenia is low bone mass, your risk increases once you reach menopause and your calcium needs then increase to 1,200mg daily    The [place] [employer] recommends that total calcium intake, from supplements and diet combined, should be no more than 2,000 milligrams daily for people older than 50.    Lifestyle    Get in at least 150 minutes of physical activity a week (30 minutes a day, 5 days of the week), of which about half should be vigorous exercise (jogging, swimming, lifting weights).  This will help you control your weight and prevent disease. You must increase the intensity and duration of exercise in order to lose weight, if that is your goal. To keep your bones strong and prevent fractures, plan at least 90 min/week of high impact exercise.    High-impact exercise includes workouts like:  Brisk walking.  Climbing stairs.  Dancing.  Hiking.  Jogging.  Jumping rope.  Step aerobics.  Light weight lifting routines.  Basilio Chi and yoga.  Tennis or other racquet sports.      Limit alcohol to one drink per day.    No smoking.      Wear sunscreen to prevent skin cancer.    See your dentist every six months for an exam and cleaning        Menopause and Perimenopause    Hot flashes, night sweats, mood changes, sleep disturbances, changes in the menstrual periods, decreased interest in sex, vaginal dryness or painful sex, and changes in the skin and hair are all common symptoms of perimenopause (the period of time, lasting several years, leading up to menopause). Menopause is defined as 12 months without a menstrual period.     It might be helpful to make a list of your symptoms, and to rank them in order of how much they bother you, or which ones you would fix first if you could. This can help us decide what treatment options might  be best for you. Treatment can include one or more of the following: lifestyle changes like diet and exercise, supplements, prescription medications for hormones, and other prescriptions that are nonhormonal. We will discuss these in more detail after your test results (if you are having lab work done) are available, when you come back. Some tests that might be done for women in perimenopause include blood work for hormone levels and to check the thyroid or ultrasound or biopsy of the lining of the uterus (if you are having abnormal bleeding).

## 2018-05-14 NOTE — MR AVS SNAPSHOT
After Visit Summary   5/14/2018    Sosa Seay    MRN: [id number]           Patient Information     Date Of Birth          1960        Visit Information        Provider Department      5/14/2018 8:30 AM Cat Knapp MD [facility] Instructions    Preventive Health Recommendations  Female Ages 40-64  Yearly exam:    See your health care provider every year in order to    Review health changes.      Discuss preventive care.       Review your medicines if you your doctor has prescribed any.    Pap Smears: You should have a Pap test every 3 years or have a Pap test with HPV screening every 5 years.    You do not need a Pap test if your uterus was removed (hysterectomy) and you have not had cancer.   Breast Cancer Screening: You should begin mammography for breast cancer screening by age 40 or 50 depending on your personal risks and your doctors recommendation. Screening should occur every year or every other year based on your discussion with your doctor.  Colorectal Cancer Screening: Starting at age 50 you need to undergo colonoscopy (every 5-10 years) or other colon cancer screening.    Health Maintenance:  - Your cholesterol should be checked every 5 years, more often if you have increased risk factors such as diabetes, high blood pressure, tobacco use, obesity, or a strong family history of cardiovascular disease before age 50 in men and 60 in women  - If you are at risk for diabetes due to being overweight or obese, having a strong family history, or having a history of gestational diabetes you should have a diabetes test (fasting glucose or Hemoglobin A1c level) every 3 years.  Shots: Get a flu shot each year. Get a tetanus shot every 10 years.    Nutrition:      Eat at least 5 servings of fruits and vegetables each day.    Eat whole-grain bread, whole-wheat pasta, faro, quinoa, barley and brown rice instead of white grains and rice.    Consume 1000mg of Calcium  and 1000u of Vitamin D daily. If these are not in your regular diet you should take a supplement.    To calculate the calcium in your food add a zero to the percent found on the packaging (ex: 30% = 300mg of calcium per serving)    Good sources of Calcium include:    Low-fat dairy products (200 to 300 milligrams per serving)      Dark green leafy vegetables     Canned salmon or sardines with bones     Soy products, such as tofu     Calcium-fortified cereals and orange juice    Osteopenia is low bone mass, your risk increases once you reach menopause and your calcium needs then increase to 1,200mg daily    The [place] of [employer] recommends that total calcium intake, from supplements and diet combined, should be no more than 2,000 milligrams daily for people older than 50.    Lifestyle    Get in at least 150 minutes of physical activity a week (30 minutes a day, 5 days of the week), of which about half should be vigorous exercise (jogging, swimming, lifting weights).  This will help you control your weight and prevent disease. You must increase the intensity and duration of exercise in order to lose weight, if that is your goal. To keep your bones strong and prevent fractures, plan at least 90 min/week of high impact exercise.    High-impact exercise includes workouts like:  Brisk walking.  Climbing stairs.  Dancing.  Hiking.  Jogging.  Jumping rope.  Step aerobics.  Light weight lifting routines.  Basilio Chi and yoga.  Tennis or other racquet sports.      Limit alcohol to one drink per day.    No smoking.      Wear sunscreen to prevent skin cancer.    See your dentist every six months for an exam and cleaning        Menopause and Perimenopause    Hot flashes, night sweats, mood changes, sleep disturbances, changes in the menstrual periods, decreased interest in sex, vaginal dryness or painful sex, and changes in the skin and hair are all common symptoms of perimenopause (the period of time, lasting several years,  leading up to menopause). Menopause is defined as 12 months without a menstrual period.     It might be helpful to make a list of your symptoms, and to rank them in order of how much they bother you, or which ones you would fix first if you could. This can help us decide what treatment options might be best for you. Treatment can include one or more of the following: lifestyle changes like diet and exercise, supplements, prescription medications for hormones, and other prescriptions that are nonhormonal. We will discuss these in more detail after your test results (if you are having lab work done) are available, when you come back. Some tests that might be done for women in perimenopause include blood work for hormone levels and to check the thyroid or ultrasound or biopsy of the lining of the uterus (if you are having abnormal bleeding).              Follow-ups after your visit        Your next 10 appointments already scheduled     Oct 05, 2018  9:00 AM CDT   Return Visit with Becky Mao PA-C   [facility] ([facility]    [address] [place] [address] [phone]              Who to contact     If you have questions or need follow up information about today's clinic visit or your schedule please contact [facility] directly at [phone].  Normal or non-critical lab and imaging results will be communicated to you by MyChart, letter or phone within 4 business days after the clinic has received the results. If you do not hear from us within 7 days, please contact the clinic through MyChart or phone. If you have a critical or abnormal lab result, we will notify you by phone as soon as possible.  Submit refill requests through [employer] or call your pharmacy and they will forward the refill request to us. Please allow 3 business days for your refill to be completed.          Additional Information About Your Visit        [facility]  "Information     Obvious lets you send messages to your doctor, view your test results, renew your prescriptions, schedule appointments and more. To sign up, go to [email or web address] . Click on \"Log in\" on the left side of the screen, which will take you to the Welcome page. Then click on \"Sign up Now\" on the right side of the page.     You will be asked to enter the access code listed below, as well as some personal information. Please follow the directions to create your username and password.     Your access code is: [email or web address]  Expires: 2018 11:04 AM     Your access code will  in 90 days. If you need help or a new code, please call your [place] clinic or [phone].        Care EveryWhere ID     This is your [facility] EveryWhere ID. This could be used by other organizations to access your [place] medical records  [id number]        Your Vitals Were     Pulse Height Last Period BMI (Body Mass Index)          80 5' 3\" (1.6 m) 2012 25.67 kg/m2         Blood Pressure from Last 3 Encounters:   18 122/80   18 130/88   02/15/18 (!) 134/93    Weight from Last 3 Encounters:   18 144 lb 14.4 oz (65.7 kg)   12 131 lb (59.4 kg)   06/17/10 132 lb (59.9 kg)              Today, you had the following     No orders found for display         Today's Medication Changes          These changes are accurate as of 18  8:54 AM.  If you have any questions, ask your nurse or doctor.               Stop taking these medicines if you haven't already. Please contact your care team if you have questions.     calcium 500 1250 (500 Ca) MG Tabs tablet   Generic drug:  calcium carbonate   Stopped by:  Cat Knapp MD                    Primary Care Provider Office Phone # Fax #    Ambrocio Ball [phone]       [facility] [address]  [place] [address]        Equal Access to Services     MABEL GO AH: mariah Newberry " pastor pinamatrever watsonalejandro monsterin hayaacynthia sánchezanamika pederson. So [facility] [place] [facility] [phone].    ATENCIÓN: Si peri haley, tiene a pineda disposición servicios gratuitos de asistencia lingüística. Llame al [phone].    We comply with applicable federal civil rights laws and [place] laws. We do not discriminate on the basis of race, color, national origin, age, disability, sex, sexual orientation, or gender identity.            Thank you!     Thank you for choosing [facility]  for your care. Our goal is always to provide you with excellent care. Hearing back from our patients is one way we can continue to improve our services. Please take a few minutes to complete the written survey that you may receive in the mail after your visit with us. Thank you!             Your Updated Medication List - Protect others around you: Learn how to safely use, store and throw away your medicines at www.disposemymeds.org.          This list is accurate as of 5/14/18  8:54 AM.  Always use your most recent med list.                   Brand Name Dispense Instructions for use Diagnosis    CALCIUM + D PO      None Entered        MULTIPLE VITAMINS CAPS   OR      1 capsule qd

## 2018-05-14 NOTE — LETTER
May 24, 2018    Sosa Seay  [address] [facility] [address]    Dear Sosa,  We are happy to inform you that your PAP smear result from 05/14/18 is normal.  We are now able to do a follow up test on PAP smears. The DNA test is for HPV (Human Papilloma Virus). Cervical cancer is closely linked with certain types of HPV. Your results showed no evidence of high risk HPV.  Therefore we recommend you return in 5 years for your next pap smear and HPV test.  You will still need to return to the clinic every year for an annual exam and other preventive tests.  Please contact the clinic at [phone] with any questions.  Sincerely,    Cat Knapp MD/[facility]

## 2018-05-14 NOTE — PROGRESS NOTES
Gynecology Clinic Visit:    SUBJECTIVE:     Sosa is a 57 year old  who presents for annual exam.   Postmenopausal since age 53.  She is having very rare night sweats, no hot flashes, not overly bothered by symptoms. No vaginal bleeding noted.   GYNECOLOGIC HISTORY:  She is sexually active with 1 male partner(s) and she is currently in monogamous relationship.    History sexually transmitted infections:No STD history  Estrogen replacement therapy: No    Exercise: walking, elliptical, resistance bands   Diet: avoids fried foods, copious fruit and vegetables, decreasing red meat  Calcium: no milk, 1 serving of yogurt and 1 serving of cheese daily    History of abnormal Pap smear: NO - age 30-65 PAP every 5 years with negative HPV co-testing recommended  Family history of breast CA: No  Family history of uterine/ovarian CA: No  Family history of colon CA: No    HEALTH MAINTENANCE:  Last Mammogram: 2017 . History of abnormal Mammo: No  Pap; (  Lab Results   Component Value Date    PAP NIL 2012    PAP NIL 2009    PAP NIL 2008     HISTORY:  Patient Active Problem List   Diagnosis     Encounter for routine gynecological examination     High triglycerides     CARDIOVASCULAR SCREENING; LDL GOAL LESS THAN 160     Past Surgical History:   Procedure Laterality Date     C NONSPECIFIC PROCEDURE      Reconstructive Surgery to Ears      Social History   Substance Use Topics     Smoking status: Never Smoker     Smokeless tobacco: Never Used     Alcohol use Yes      Comment: 3-4 glasses of wine per week      Problem (# of Occurrences) Relation (Name,Age of Onset)    Chronic Obstructive Pulmonary Disease (1) Father    Depression (1) Mother: suicide at age 59    Hypertension (1) Father    Skin Cancer (2) Father, Paternal Grandmother              Obstetric History       T0      L0     SAB0   TAB1   Ectopic0   Multiple0   Live Births0       # Outcome Date GA Lbr Long/2nd Weight Sex Delivery  "Anes PTL Lv   1 TAB                 Past Medical History:   Diagnosis Date     Allergic rhinitis, cause unspecified     Allergic rhinitis     Squamous cell carcinoma          Current Outpatient Prescriptions:      CALCIUM + D PO, None Entered, Disp: , Rfl:      MULTIPLE VITAMINS CAPS   OR, 1 capsule qd , Disp: , Rfl:    Allergies   Allergen Reactions     Codeine      Penicillins      augmentin     Sulfa Drugs      Past medical, surgical, social and family history were reviewed and updated in EPIC.    OBJECTIVE:     EXAM:  /80 (BP Location: Right arm, Patient Position: Chair, Cuff Size: Adult Regular)  Pulse 80  Ht 5' 3\" (1.6 m)  Wt 144 lb 14.4 oz (65.7 kg)  LMP 2012  BMI 25.67 kg/m2 Body mass index is 25.67 kg/(m^2).   Constitutional: healthy, alert and no distress  Head: Normocephalic. No masses, lesions, tenderness or abnormalities  Neck: Neck supple. Trachea midline. No adenopathy. Thyroid symmetric, normal size.   Cardiovascular: regular rate, well perfused   Respiratory: normal respiratory effort, no cough  Breast: No visible masses or suspicious skin changes.  No discrete or dominant masses to palpation.  No axillary lymphadenopathy.  Gastrointestinal: Abdomen soft, non-tender, non-distended. No masses, organomegaly  Vulva:  No external lesions, normal female hair distribution, no inguinal adenopathy.    Urethra:  Midline, non-tender, well supported, no discharge  Vagina:  Atrophic, no abnormal discharge, no lesions  Cervix: no lesions, no discharge  Uterus: small, smooth  anteverted, smooth contour, without enlargement, mobile, and without tenderness  Ovaries:  No masses appreciated, non-tender, mobile  Musculoskeletal: extremities normal  Skin: no suspicious lesions or rashes  Psychiatric: Affect appropriate, cooperative, mentation appears normal.       ASSESSMENT/PLAN:                                                    Sosa Seay is a 57 year old female  with satisfactory " annual exam.    PLAN:  Dx:  1)  Pap smear collected for co-testing. Per ASCCP guidelines repeat in 5 years if normal  2)  Mammogram: due 12/2018  3)  Colon cancer screening: desires fit kit. Referred to IM to establish care and review colon cancer screening options.     PE:  Reviewed health maintenance including diet, regular exercise   and periodic exams.    Return for any Gyn concerns, recommend annual breast exam and visual vulvar exam.      COUNSELING:   Reviewed preventive health counseling, as reflected in patient instructions   reports that she has never smoked. She has never used smokeless tobacco.        FRAX Risk Assessment    Cat Knapp MD   Obstetrics and Gynecology  5/14/2018

## 2018-05-16 LAB
COPATH REPORT: NORMAL
PAP: NORMAL

## 2018-05-17 LAB
FINAL DIAGNOSIS: NORMAL
HPV HR 12 DNA CVX QL NAA+PROBE: NEGATIVE
HPV16 DNA SPEC QL NAA+PROBE: NEGATIVE
HPV18 DNA SPEC QL NAA+PROBE: NEGATIVE
SPECIMEN DESCRIPTION: NORMAL
SPECIMEN SOURCE CVX/VAG CYTO: NORMAL

## 2018-06-07 ENCOUNTER — OFFICE VISIT (OUTPATIENT)
Dept: PEDIATRICS | Facility: CLINIC | Age: 58
End: 2018-06-07
Payer: COMMERCIAL

## 2018-06-07 VITALS
HEIGHT: 63 IN | BODY MASS INDEX: 25.69 KG/M2 | WEIGHT: 145 LBS | TEMPERATURE: 98.6 F | SYSTOLIC BLOOD PRESSURE: 106 MMHG | DIASTOLIC BLOOD PRESSURE: 72 MMHG | HEART RATE: 60 BPM

## 2018-06-07 DIAGNOSIS — E78.2 MIXED HYPERLIPIDEMIA: ICD-10-CM

## 2018-06-07 DIAGNOSIS — Z23 NEED FOR TDAP VACCINATION: ICD-10-CM

## 2018-06-07 DIAGNOSIS — Z00.00 HEALTH CARE MAINTENANCE: ICD-10-CM

## 2018-06-07 DIAGNOSIS — Z12.11 SPECIAL SCREENING FOR MALIGNANT NEOPLASMS, COLON: Primary | ICD-10-CM

## 2018-06-07 PROCEDURE — 90471 IMMUNIZATION ADMIN: CPT | Performed by: NURSE PRACTITIONER

## 2018-06-07 PROCEDURE — 99213 OFFICE O/P EST LOW 20 MIN: CPT | Mod: 25 | Performed by: NURSE PRACTITIONER

## 2018-06-07 PROCEDURE — 90715 TDAP VACCINE 7 YRS/> IM: CPT | Performed by: NURSE PRACTITIONER

## 2018-06-07 NOTE — PROGRESS NOTES
"  SUBJECTIVE:   Sosa Seay is a 57 year old female who presents to clinic today for the following health issues:      New Patient/Transfer of Care    Doesn't want to have colonoscopy because she is afraid of the prep. Wants to do the FIT. No symptoms of colon cancer.     ROS: const/endo/gi otherwise negative     OBJECTIVE:  /72 (BP Location: Right arm, Patient Position: Right side, Cuff Size: Adult Regular)  Pulse 60  Temp 98.6  F (37  C) (Tympanic)  Ht 5' 3\" (1.6 m)  Wt 145 lb (65.8 kg)  LMP 01/04/2012  BMI 25.69 kg/m2  CONSTITUTIONAL: Alert, well-nourished, well-groomed, NAD  RESP: Lungs CTA. No wheeze, rhonchi, rales.  CV: HRRR S1 S2 No MRG. No peripheral edema      ASSESSMENT/PLAN:  (Z12.11) Special screening for malignant neoplasms, colon  (primary encounter diagnosis)  Comment: Declines colonoscopy. Discussed FIT.   Plan: Fecal colorectal cancer screen (FIT)        Repeat yearly    (E78.2) Mixed hyperlipidemia  Comment:  based on labs done at work. ASCVD risk 1.7%.  Plan:   -Recommended lifestyle changes  -She will fax me her results next November.     (Z23) Need for Tdap vaccination  Plan: TDAP VACCINE (ADACEL), ADMIN 1st VACCINE              Marilee Alexander, DELMISP-DNP.            "

## 2018-06-07 NOTE — MR AVS SNAPSHOT
After Visit Summary   6/7/2018    Sosa Seay    MRN: [id number]           Patient Information     Date Of Birth          1960        Visit Information        Provider Department      6/7/2018 8:20 AM Marilee Munoz APRN CNP [facility]        Today's Diagnoses     Special screening for malignant neoplasms, colon    -  1    Mixed hyperlipidemia        Need for Tdap vaccination        Health care maintenance           Follow-ups after your visit        Follow-up notes from your care team     Return in about 1 year (around 6/7/2019) for Routine Visit.      Your next 10 appointments already scheduled     Oct 05, 2018  9:00 AM CDT   Return Visit with Becky Mao PA-C   [facility] ([facility])    [address] [address] [phone]              Future tests that were ordered for you today     Open Future Orders        Priority Expected Expires Ordered    Fecal colorectal cancer screen (FIT) Routine 6/28/2018 8/30/2018 6/7/2018            Who to contact     If you have questions or need follow up information about today's clinic visit or your schedule please contact [facility] directly at [phone].  Normal or non-critical lab and imaging results will be communicated to you by MyChart, letter or phone within 4 business days after the clinic has received the results. If you do not hear from us within 7 days, please contact the clinic through MyChart or phone. If you have a critical or abnormal lab result, we will notify you by phone as soon as possible.  Submit refill requests through [employer] or call your pharmacy and they will forward the refill request to us. Please allow 3 business days for your refill to be completed.          Additional Information About Your Visit        Care EveryWhere ID     This is your Care EveryWhere ID. This could be used by other organizations to access  "your [place] medical records  [id number]        Your Vitals Were     Pulse Temperature Height Last Period BMI (Body Mass Index)       60 98.6  F (37  C) (Tympanic) 5' 3\" (1.6 m) 01/04/2012 25.69 kg/m2        Blood Pressure from Last 3 Encounters:   06/07/18 106/72   05/14/18 122/80   04/05/18 130/88    Weight from Last 3 Encounters:   06/07/18 145 lb (65.8 kg)   05/14/18 144 lb 14.4 oz (65.7 kg)   01/26/12 131 lb (59.4 kg)              We Performed the Following     ADMIN 1st VACCINE     TDAP VACCINE (ADACEL)        Primary Care Provider Office Phone # Fax #    [facility] Quentin Ball [phone]       [facility] [address]  [place] [address]        Equal Access to Services     [facility] Center[facility]IA : Hadii aad ku hadasho Soisaakali, waaxda luqadaha, qaybta kaalmada adeegyada, waxay monsterin haygiseln sharon eastman . So [facility] [phone].    ATENCIÓN: Si habla español, tiene a pineda disposición servicios gratuitos de asistencia lingüística. Keyur al [phone].    We comply with applicable federal civil rights laws and [place] laws. We do not discriminate on the basis of race, color, national origin, age, disability, sex, sexual orientation, or gender identity.            Thank you!     Thank you for choosing [facility]  for your care. Our goal is always to provide you with excellent care. Hearing back from our patients is one way we can continue to improve our services. Please take a few minutes to complete the written survey that you may receive in the mail after your visit with us. Thank you!             Your Updated Medication List - Protect others around you: Learn how to safely use, store and throw away your medicines at www.disposemymeds.org.          This list is accurate as of 6/7/18  8:43 AM.  Always use your most recent med list.                   Brand Name Dispense Instructions for use Diagnosis    CALCIUM + D PO      None Entered        MULTIPLE VITAMINS CAPS  "  OR      1 capsule qd

## 2018-08-29 PROCEDURE — 82274 ASSAY TEST FOR BLOOD FECAL: CPT | Performed by: NURSE PRACTITIONER

## 2018-08-30 DIAGNOSIS — Z12.11 SPECIAL SCREENING FOR MALIGNANT NEOPLASMS, COLON: ICD-10-CM

## 2018-08-30 LAB — HEMOCCULT STL QL IA: NEGATIVE

## 2018-10-26 ENCOUNTER — OFFICE VISIT (OUTPATIENT)
Dept: DERMATOLOGY | Facility: CLINIC | Age: 58
End: 2018-10-26
Payer: COMMERCIAL

## 2018-10-26 VITALS — HEART RATE: 97 BPM | OXYGEN SATURATION: 98 % | DIASTOLIC BLOOD PRESSURE: 80 MMHG | SYSTOLIC BLOOD PRESSURE: 118 MMHG

## 2018-10-26 DIAGNOSIS — D22.9 NEVUS: ICD-10-CM

## 2018-10-26 DIAGNOSIS — Z85.89 HISTORY OF SQUAMOUS CELL CARCINOMA: ICD-10-CM

## 2018-10-26 DIAGNOSIS — L72.0 MILIA: ICD-10-CM

## 2018-10-26 DIAGNOSIS — D18.00 ANGIOMA: Primary | ICD-10-CM

## 2018-10-26 DIAGNOSIS — L81.4 LENTIGO: ICD-10-CM

## 2018-10-26 DIAGNOSIS — L82.1 SEBORRHEIC KERATOSIS: ICD-10-CM

## 2018-10-26 PROCEDURE — 99214 OFFICE O/P EST MOD 30 MIN: CPT | Performed by: PHYSICIAN ASSISTANT

## 2018-10-26 NOTE — MR AVS SNAPSHOT
After Visit Summary   10/26/2018    Sosa Seay    MRN: [id number]           Patient Information     Date Of Birth          1960        Visit Information        Provider Department      10/26/2018 9:45 AM Becky Mao PA-C [facility]        Today's Diagnoses     Angioma    -  1    Lentigo        Seborrheic keratosis        Nevus        Milia        History of squamous cell carcinoma           Follow-ups after your visit        Who to contact     If you have questions or need follow up information about today's clinic visit or your schedule please contact [facility] directly at [phone].  Normal or non-critical lab and imaging results will be communicated to you by MyChart, letter or phone within 4 business days after the clinic has received the results. If you do not hear from us within 7 days, please contact the clinic through MyChart or phone. If you have a critical or abnormal lab result, we will notify you by phone as soon as possible.  Submit refill requests through [employer] or call your pharmacy and they will forward the refill request to us. Please allow 3 business days for your refill to be completed.          Additional Information About Your Visit        Care EveryWhere ID     This is your Care EveryWhere ID. This could be used by other organizations to access your [place] medical records  [id number]        Your Vitals Were     Pulse Last Period Pulse Oximetry             97 01/04/2012 98%          Blood Pressure from Last 3 Encounters:   10/26/18 118/80   06/07/18 106/72   05/14/18 122/80    Weight from Last 3 Encounters:   06/07/18 65.8 kg (145 lb)   05/14/18 65.7 kg (144 lb 14.4 oz)   01/26/12 59.4 kg (131 lb)              Today, you had the following     No orders found for display       Primary Care Provider Office Phone # Fax #    Ambrocio Ball [phone] [address]  [facility] [address]        Equal Access to Services     DINORASANFORD ABBI : Hadii aad ku hadluciamargie Ackerman, wasaniada silvana, qaybta delfinamadarcy lovelace, renetta mcdanielskristynjazzy pederson. So [facility] [phone].    ATENCIÓN: Si habla español, tiene a pineda disposición servicios gratuitos de asistencia lingüística. Llame al [phone].    We comply with applicable federal civil rights laws and [place] laws. We do not discriminate on the basis of race, color, national origin, age, disability, sex, sexual orientation, or gender identity.            Thank you!     Thank you for choosing [facility]  for your care. Our goal is always to provide you with excellent care. Hearing back from our patients is one way we can continue to improve our services. Please take a few minutes to complete the written survey that you may receive in the mail after your visit with us. Thank you!             Your Updated Medication List - Protect others around you: Learn how to safely use, store and throw away your medicines at www.disposemymeds.org.          This list is accurate as of 10/26/18 12:43 PM.  Always use your most recent med list.                   Brand Name Dispense Instructions for use Diagnosis    CALCIUM + D PO      None Entered        MULTIPLE VITAMINS CAPS   OR      1 capsule qd

## 2018-10-26 NOTE — LETTER
10/26/2018         RE: Sosa Seay  [address] [facility] [address]        Dear Colleague,    Thank you for referring your patient, Sosa Seay, to the [facility]. Please see a copy of my visit note below.    HPI:   Sosa Seay is a 58 year old female who presents for Full skin cancer screening.  chief complaint  Last Skin Exam: none      1st Baseline: no  Personal HX of Skin Cancer: SCC on R forehead 2018   Personal HX of Malignant Melanoma: none   Family HX of Skin Cancer / Malignant Melanoma: none  Personal HX of Atypical Moles:   none  Risk factors: sun exposure  New / Changing lesions: yes, spots on face   Social History: enjoys craft shows; going to two this weekend  On review of systems, there are no further skin complaints, patient is feeling otherwise well.  See patient intake sheet.  ROS of the following were done and are negative: Constitutional, Eyes, Ears, Nose,   Mouth, Throat, Cardiovascular, Respiratory, GI, Genitourinary, Musculoskeletal,   Psychiatric, Endocrine, Allergic/Immunologic.    This document serves as a record of the services and decisions personally performed and made by Becky Mao, MS, PA-C. It was created on her behalf by Bri Cason, a trained medical scribe. The creation of this document is based on the provider's statements to the medical scribe.  Bri Cason 9:52 AM October 26, 2018    PHYSICAL EXAM:   /80  Pulse 97  LMP 01/04/2012  SpO2 98%  Skin exam performed as follows: Type 2 skin. Mood appropriate  Alert and Oriented X 3. Well developed, well nourished in no distress.  General appearance: Normal  Head including face: Normal  Eyes: conjunctiva and lids: Normal  Mouth: Lips, teeth, gums: Normal  Neck: Normal  Chest-breast/axillae: Normal  Back: Normal  Spleen and liver: Normal  Cardiovascular: Exam of peripheral vascular system by observation for swelling, varicosities, edema:  Normal  Genitalia: groin, buttocks: Normal  Extremities: digits/nails (clubbing): Normal  Eccrine and Apocrine glands: Normal  Right upper extremity: Normal  Left upper extremity: Normal  Right lower extremity: Normal  Left lower extremity: Normal  Skin: Scalp and body hair: See below    Pt deferred exam of breasts, groin, buttocks: No    Other physical findings:  1. Multiple pigmented macules on extremities and trunk  2. Multiple pigmented macules on face, trunk and extremities  3. Multiple vascular papules on trunk, arms and legs  4. Multiple scattered keratotic plaques       Except as noted above, no other signs of skin cancer or melanoma.     ASSESSMENT/PLAN:   Benign Full skin cancer screening today.        Patient with history of SCC on forehead 2018  Advised on monthly self exams and 1 year  Patient Education: Appropriate brochures given.    Multiple benign appearing nevi on arms, legs and trunk. Discussed ABCDEs of melanoma and sunscreen.   Multiple lentigos on arms, legs and trunk. Advised benign, no treatment needed.  Multiple scattered angiomas. Advised benign, no treatment needed.   Seborrheic keratosis on arms, legs and trunk. Advised benign, no treatment needed.  Milia on face. Advised benign; discussed cosmetic extraction if desired.       Follow-up: yearly FSE/PRN sooner     1.) Patient was asked about new and changing moles. YES  2.) Patient received a complete physical skin examination: YES  3.) Patient was counseled to perform a monthly self skin examination: YES  Scribed By: Bri Cason, Medical Scribe    The information in this document, created by the medical scribe for me, accurately reflects the services I personally performed and the decisions made by me. I have reviewed and approved this document for accuracy prior to leaving the patient care area.  October 26, 2018 9:58 AM    Becky Mao, MS, PA-C\    Again, thank you for allowing me to participate in the care of your  patient.        Sincerely,        Becky Darling PA-C

## 2018-10-26 NOTE — PROGRESS NOTES
HPI:   Sosa Seay is a 58 year old female who presents for Full skin cancer screening.  chief complaint  Last Skin Exam: none      1st Baseline: no  Personal HX of Skin Cancer: SCC on R forehead 2018   Personal HX of Malignant Melanoma: none   Family HX of Skin Cancer / Malignant Melanoma: none  Personal HX of Atypical Moles:   none  Risk factors: sun exposure  New / Changing lesions: yes, spots on face   Social History: enjoys craft shows; going to two this weekend  On review of systems, there are no further skin complaints, patient is feeling otherwise well.  See patient intake sheet.  ROS of the following were done and are negative: Constitutional, Eyes, Ears, Nose,   Mouth, Throat, Cardiovascular, Respiratory, GI, Genitourinary, Musculoskeletal,   Psychiatric, Endocrine, Allergic/Immunologic.    This document serves as a record of the services and decisions personally performed and made by Becky Mao, MS, PA-C. It was created on her behalf by Bri Cason, a trained medical scribe. The creation of this document is based on the provider's statements to the medical scribe.  Bri Cason 9:52 AM October 26, 2018    PHYSICAL EXAM:   /80  Pulse 97  LMP 01/04/2012  SpO2 98%  Skin exam performed as follows: Type 2 skin. Mood appropriate  Alert and Oriented X 3. Well developed, well nourished in no distress.  General appearance: Normal  Head including face: Normal  Eyes: conjunctiva and lids: Normal  Mouth: Lips, teeth, gums: Normal  Neck: Normal  Chest-breast/axillae: Normal  Back: Normal  Spleen and liver: Normal  Cardiovascular: Exam of peripheral vascular system by observation for swelling, varicosities, edema: Normal  Genitalia: groin, buttocks: Normal  Extremities: digits/nails (clubbing): Normal  Eccrine and Apocrine glands: Normal  Right upper extremity: Normal  Left upper extremity: Normal  Right lower extremity: Normal  Left lower extremity: Normal  Skin: Scalp  and body hair: See below    Pt deferred exam of breasts, groin, buttocks: No    Other physical findings:  1. Multiple pigmented macules on extremities and trunk  2. Multiple pigmented macules on face, trunk and extremities  3. Multiple vascular papules on trunk, arms and legs  4. Multiple scattered keratotic plaques       Except as noted above, no other signs of skin cancer or melanoma.     ASSESSMENT/PLAN:   Benign Full skin cancer screening today.        Patient with history of SCC on forehead 2018  Advised on monthly self exams and 1 year  Patient Education: Appropriate brochures given.    Multiple benign appearing nevi on arms, legs and trunk. Discussed ABCDEs of melanoma and sunscreen.   Multiple lentigos on arms, legs and trunk. Advised benign, no treatment needed.  Multiple scattered angiomas. Advised benign, no treatment needed.   Seborrheic keratosis on arms, legs and trunk. Advised benign, no treatment needed.  Milia on face. Advised benign; discussed cosmetic extraction if desired.       Follow-up: yearly FSE/PRN sooner     1.) Patient was asked about new and changing moles. YES  2.) Patient received a complete physical skin examination: YES  3.) Patient was counseled to perform a monthly self skin examination: YES  Scribed By: Bri Cason, Medical Scribe    The information in this document, created by the medical scribe for me, accurately reflects the services I personally performed and the decisions made by me. I have reviewed and approved this document for accuracy prior to leaving the patient care area.  October 26, 2018 9:58 AM    Becky Mao MS, PA-C\

## 2019-12-06 ENCOUNTER — OFFICE VISIT (OUTPATIENT)
Dept: DERMATOLOGY | Facility: CLINIC | Age: 59
End: 2019-12-06
Payer: COMMERCIAL

## 2019-12-06 VITALS — DIASTOLIC BLOOD PRESSURE: 91 MMHG | OXYGEN SATURATION: 98 % | HEART RATE: 84 BPM | SYSTOLIC BLOOD PRESSURE: 135 MMHG

## 2019-12-06 DIAGNOSIS — Z85.89 HISTORY OF SQUAMOUS CELL CARCINOMA: ICD-10-CM

## 2019-12-06 DIAGNOSIS — D18.01 ANGIOMA OF SKIN: ICD-10-CM

## 2019-12-06 DIAGNOSIS — D22.9 NEVUS: Primary | ICD-10-CM

## 2019-12-06 DIAGNOSIS — L81.4 LENTIGO: ICD-10-CM

## 2019-12-06 DIAGNOSIS — L82.1 SEBORRHEIC KERATOSIS: ICD-10-CM

## 2019-12-06 PROCEDURE — 99214 OFFICE O/P EST MOD 30 MIN: CPT | Performed by: PHYSICIAN ASSISTANT

## 2019-12-06 NOTE — PROGRESS NOTES
HPI:   Chief complaint: Sosa Seay is a 59 year old female who presents for Full skin cancer screening to rule out skin cancer.  Last Skin Exam: 1 year ago      1st Baseline: no  Personal HX of Skin Cancer: SCC on R forehead 2018   Personal HX of Malignant Melanoma: none   Family HX of Skin Cancer / Malignant Melanoma: none  Personal HX of Atypical Moles: none  Risk factors: sun exposure  New / Changing lesions: none  Social History: enjoys craft shows   On review of systems, there are no further skin complaints, patient is feeling otherwise well.  See patient intake sheet.  ROS of the following were done and are negative: Constitutional, Eyes, Ears, Nose,   Mouth, Throat, Cardiovascular, Respiratory, GI, Genitourinary, Musculoskeletal,   Psychiatric, Endocrine, Allergic/Immunologic.    HPI, past medical history, social history, allergies, medications reviewed as of December 6, 2019     This document serves as a record of the services and decisions personally performed and made by Becky Mao, MS, PA-C. It was created on her behalf by Bri Cason, a trained medical scribe. The creation of this document is based on the provider's statements to the medical scribe.  Bri Cason 10:03 AM December 6, 2019    PHYSICAL EXAM:   BP (!) 135/91   Pulse 84   LMP 01/04/2012   SpO2 98%   Breastfeeding No   Skin exam performed as follows: Type 2 skin. Mood appropriate  Alert and Oriented X 3. Well developed, well nourished in no distress.  General appearance: Normal  Head including face: Normal  Eyes: conjunctiva and lids: Normal  Mouth: Lips, teeth, gums: Normal  Neck: Normal  Chest-breast/axillae: Normal  Back: Normal  Spleen and liver: Normal  Cardiovascular: Exam of peripheral vascular system by observation for swelling, varicosities, edema: Normal  Genitalia: groin, buttocks: Normal  Extremities: digits/nails (clubbing): Normal  Eccrine and Apocrine glands: Normal  Right upper  extremity: Normal  Left upper extremity: Normal  Right lower extremity: Normal  Left lower extremity: Normal  Skin: Scalp and body hair: See below    Pt deferred exam of breasts, groin, buttocks: No    Other physical findings:  1. Multiple pigmented macules on extremities and trunk  2. Multiple pigmented macules on face, trunk and extremities  3. Multiple vascular papules on trunk, arms and legs  4. Multiple scattered keratotic plaques       Except as noted above, no other signs of skin cancer or melanoma.     ASSESSMENT/PLAN:   Benign Full skin cancer screening today.     Patient with history of SCC on forehead 2018  Advised on monthly self exams and 1 year  Patient Education: Appropriate brochures given.    Multiple benign appearing nevi on arms, legs and trunk. Discussed ABCDEs of melanoma and sunscreen.   Multiple lentigos on arms, legs and trunk. Advised benign, no treatment needed.  Multiple scattered angiomas. Advised benign, no treatment needed.   Seborrheic keratosis on arms, legs and trunk. Advised benign, no treatment needed.  Sosa to follow up with Primary Care provider regarding elevated blood pressure.       Follow-up: yearly FSE/PRN sooner     1.) Patient was asked about new and changing moles. YES  2.) Patient received a complete physical skin examination: YES  3.) Patient was counseled to perform a monthly self skin examination: YES  Scribed By: Bri Cason, Medical Scribe    The information in this document, created by the medical scribe for me, accurately reflects the services I personally performed and the decisions made by me. I have reviewed and approved this document for accuracy prior to leaving the patient care area.  December 6, 2019 10:08 AM    Becky Mao MS, PA-C\

## 2022-03-21 ENCOUNTER — ANCILLARY PROCEDURE (OUTPATIENT)
Dept: MAMMOGRAPHY | Facility: CLINIC | Age: 62
End: 2022-03-21
Attending: NURSE PRACTITIONER
Payer: COMMERCIAL

## 2022-03-21 DIAGNOSIS — Z12.31 VISIT FOR SCREENING MAMMOGRAM: ICD-10-CM

## 2022-03-21 PROCEDURE — 77067 SCR MAMMO BI INCL CAD: CPT | Mod: TC | Performed by: RADIOLOGY

## 2023-02-07 ENCOUNTER — OFFICE VISIT (OUTPATIENT)
Dept: DERMATOLOGY | Facility: CLINIC | Age: 63
End: 2023-02-07
Payer: COMMERCIAL

## 2023-02-07 DIAGNOSIS — Z85.828 HISTORY OF SCC (SQUAMOUS CELL CARCINOMA) OF SKIN: ICD-10-CM

## 2023-02-07 DIAGNOSIS — L81.4 LENTIGO: ICD-10-CM

## 2023-02-07 DIAGNOSIS — D18.01 ANGIOMA OF SKIN: ICD-10-CM

## 2023-02-07 DIAGNOSIS — D22.9 NEVUS: Primary | ICD-10-CM

## 2023-02-07 DIAGNOSIS — L82.1 SEBORRHEIC KERATOSIS: ICD-10-CM

## 2023-02-07 DIAGNOSIS — D48.5 NEOPLASM OF UNCERTAIN BEHAVIOR OF SKIN: ICD-10-CM

## 2023-02-07 PROCEDURE — 88342 IMHCHEM/IMCYTCHM 1ST ANTB: CPT | Performed by: PATHOLOGY

## 2023-02-07 PROCEDURE — 88305 TISSUE EXAM BY PATHOLOGIST: CPT | Performed by: PATHOLOGY

## 2023-02-07 PROCEDURE — 11102 TANGNTL BX SKIN SINGLE LES: CPT | Performed by: PHYSICIAN ASSISTANT

## 2023-02-07 PROCEDURE — 99203 OFFICE O/P NEW LOW 30 MIN: CPT | Mod: 25 | Performed by: PHYSICIAN ASSISTANT

## 2023-02-07 ASSESSMENT — PAIN SCALES - GENERAL: PAINLEVEL: NO PAIN (0)

## 2023-02-07 NOTE — PROGRESS NOTES
HPI:   Chief complaint: Sosa Seay is a 62 year old female who presents for Full skin cancer screening to rule out skin cancer.  Last Skin Exam: 3 years ago     1st Baseline: no  Personal HX of Skin Cancer: SCC on R forehead 2018   Personal HX of Malignant Melanoma: none   Family HX of Skin Cancer / Malignant Melanoma: none  Personal HX of Atypical Moles: none  Risk factors: sun exposure  New / Changing lesions: none  Social History:  passed away in 2020 from cancer. Working from home -  for [employer]   On review of systems, there are no further skin complaints, patient is feeling otherwise well.  See patient intake sheet.  ROS of the following were done and are negative: Constitutional, Eyes, Ears, Nose,   Mouth, Throat, Cardiovascular, Respiratory, GI, Genitourinary, Musculoskeletal,   Psychiatric, Endocrine, Allergic/Immunologic.        PHYSICAL EXAM:   [facility] 01/04/2012   Skin exam performed as follows: Type 2 skin. Mood appropriate  Alert and Oriented X 3. Well developed, well nourished in no distress.  General appearance: Normal  Head including face: Normal  Eyes: conjunctiva and lids: Normal  Mouth: Lips, teeth, gums: Normal  Neck: Normal  Chest-breast/axillae: Normal  Back: Normal  Spleen and liver: Normal  Cardiovascular: Exam of peripheral vascular system by observation for swelling, varicosities, edema: Normal  Genitalia: groin, buttocks: Normal  Extremities: digits/nails (clubbing): Normal  Eccrine and Apocrine glands: Normal  Right upper extremity: Normal  Left upper extremity: Normal  Right lower extremity: Normal  Left lower extremity: Normal  Skin: Scalp and body hair: See below    Pt deferred exam of breasts, groin, buttocks: No    Other physical findings:  1. Multiple pigmented macules on extremities and trunk  2. Multiple pigmented macules on face, trunk and extremities  3. Multiple vascular papules on trunk, arms and legs  4. Multiple scattered keratotic plaques  5. 4 mm  irregular brown macule on the right calf       Except as noted above, no other signs of skin cancer or melanoma.     ASSESSMENT/PLAN:   Benign Full skin cancer screening today.     Patient with history of SCC on forehead 2018  Advised on monthly self exams and 1 year  Patient Education: Appropriate brochures given.    Multiple benign appearing nevi on arms, legs and trunk. Discussed ABCDEs of melanoma and sunscreen.   Multiple lentigos on arms, legs and trunk. Advised benign, no treatment needed.  Multiple scattered angiomas. Advised benign, no treatment needed.   Seborrheic keratosis on arms, legs and trunk. Advised benign, no treatment needed.  Atypical nevus on the right calf. Shave biopsy performed.  Area cleaned and anesthetized with 1% lidocaine with epinephrine.  Dermablade used to remove the lesion and sent to pathology. Bleeding was cauterized. Pt tolerated procedure well with no complications.         Follow-up: yearly FSE/PRN sooner     1.) Patient was asked about new and changing moles. YES  2.) Patient received a complete physical skin examination: YES  3.) Patient was counseled to perform a monthly self skin examination: YES  Scribed By:  Becky Mao, MS, PA-C\

## 2023-02-07 NOTE — LETTER
2/7/2023         RE: Sosa Seay  [address] [facility] [address]        Dear Colleague,    Thank you for referring your patient, Sosa Seay, to the [facility]. Please see a copy of my visit note below.    HPI:   Chief complaint: Sosa Seay is a 62 year old female who presents for Full skin cancer screening to rule out skin cancer.  Last Skin Exam: 3 years ago     1st Baseline: no  Personal HX of Skin Cancer: SCC on R forehead 2018   Personal HX of Malignant Melanoma: none   Family HX of Skin Cancer / Malignant Melanoma: none  Personal HX of Atypical Moles: none  Risk factors: sun exposure  New / Changing lesions: none  Social History:  passed away in 2020 from cancer. Working from home -  for [employer]   On review of systems, there are no further skin complaints, patient is feeling otherwise well.  See patient intake sheet.  ROS of the following were done and are negative: Constitutional, Eyes, Ears, Nose,   Mouth, Throat, Cardiovascular, Respiratory, GI, Genitourinary, Musculoskeletal,   Psychiatric, Endocrine, Allergic/Immunologic.        PHYSICAL EXAM:   LMP 01/04/2012   Skin exam performed as follows: Type 2 skin. Mood appropriate  Alert and Oriented X 3. Well developed, well nourished in no distress.  General appearance: Normal  Head including face: Normal  Eyes: conjunctiva and lids: Normal  Mouth: Lips, teeth, gums: Normal  Neck: Normal  Chest-breast/axillae: Normal  Back: Normal  Spleen and liver: Normal  Cardiovascular: Exam of peripheral vascular system by observation for swelling, varicosities, edema: Normal  Genitalia: groin, buttocks: Normal  Extremities: digits/nails (clubbing): Normal  Eccrine and Apocrine glands: Normal  Right upper extremity: Normal  Left upper extremity: Normal  Right lower extremity: Normal  Left lower extremity: Normal  Skin: Scalp and body hair: See below    Pt deferred exam of breasts,  groin, buttocks: No    Other physical findings:  1. Multiple pigmented macules on extremities and trunk  2. Multiple pigmented macules on face, trunk and extremities  3. Multiple vascular papules on trunk, arms and legs  4. Multiple scattered keratotic plaques  5. 4 mm irregular brown macule on the right calf       Except as noted above, no other signs of skin cancer or melanoma.     ASSESSMENT/PLAN:   Benign Full skin cancer screening today.     Patient with history of SCC on forehead 2018  Advised on monthly self exams and 1 year  Patient Education: Appropriate brochures given.    Multiple benign appearing nevi on arms, legs and trunk. Discussed ABCDEs of melanoma and sunscreen.   Multiple lentigos on arms, legs and trunk. Advised benign, no treatment needed.  Multiple scattered angiomas. Advised benign, no treatment needed.   Seborrheic keratosis on arms, legs and trunk. Advised benign, no treatment needed.  Atypical nevus on the right calf. Shave biopsy performed.  Area cleaned and anesthetized with 1% lidocaine with epinephrine.  Dermablade used to remove the lesion and sent to pathology. Bleeding was cauterized. Pt tolerated procedure well with no complications.         Follow-up: yearly FSE/PRN sooner     1.) Patient was asked about new and changing moles. YES  2.) Patient received a complete physical skin examination: YES  3.) Patient was counseled to perform a monthly self skin examination: YES  Scribed By:  Becky Mao MS, ABBIE\      Again, thank you for allowing me to participate in the care of your patient.        Sincerely,        Becky Mao PA-C

## 2023-02-07 NOTE — PATIENT INSTRUCTIONS
Wound Care Instructions     FOR SUPERFICIAL WOUNDS     [facility]  [phone]                 AFTER 24 HOURS YOU SHOULD REMOVE THE BANDAGE AND BEGIN DAILY DRESSING CHANGES AS FOLLOWS:     1) Remove Dressing.     2) Clean and dry the area with tap water using a Q-tip or sterile gauze pad.     3) Apply Vaseline, Aquaphor, Polysporin ointment or Bacitracin ointment over entire wound.  Do NOT use Neosporin ointment.     4) Cover the wound with a band-aid, or a sterile non-stick gauze pad and micropore paper tape    REPEAT THESE INSTRUCTIONS AT LEAST ONCE A DAY UNTIL THE WOUND HAS COMPLETELY HEALED.    It is an old wives tale that a wound heals better when it is exposed to air and allowed to dry out. The wound will heal faster with a better cosmetic result if it is kept moist with ointment and covered with a bandage.    **Do not let the wound dry out.**    Supplies Needed:      *Cotton tipped applicators (Q-tips)    *Vaseline, Aquaphor, Polysporin or Bacitracin Ointment (NOT NEOSPORIN)    *Band-aids or non-stick gauze pads and micropore paper tape.      PATIENT INFORMATION:    During the healing process you will notice a number of changes. All wounds develop a small halo of redness surrounding the wound.  This means healing is occurring. Severe itching with extensive redness usually indicates sensitivity to the ointment or bandage tape used to dress the wound.  You should call our office if this develops.      Swelling  and/or discoloration around your surgical site is common, particularly when performed around the eye.    All wounds normally drain.  The larger the wound the more drainage there will be.  After 7-10 days, you will notice the wound beginning to shrink and new skin will begin to grow.  The wound is healed when you can see skin has formed over the entire area.  A healed wound has a healthy, shiny look to the surface and is red to dark pink in color to normalize.  Wounds may  take approximately 4-6 weeks to heal.  Larger wounds may take 6-8 weeks.  After the wound is healed you may discontinue dressing changes.    You may experience a sensation of tightness as your wound heals. This is normal and will gradually subside.    Your healed wound may be sensitive to temperature changes. This sensitivity improves with time, but if you re having a lot of discomfort, try to avoid temperature extremes.    Patients frequently experience itching after their wound appears to have healed because of the continue healing under the skin.  Plain Vaseline will help relieve the itching.      POSSIBLE COMPLICATIONS    BLEEDING:    Leave the bandage in place.  Use tightly rolled up gauze or a cloth to apply direct pressure over the bandage for 30  minutes.  Reapply pressure for an additional 30 minutes if necessary  Use additional gauze and tape to maintain pressure once the bleeding has stopped.

## 2023-02-12 LAB
PATH REPORT.COMMENTS IMP SPEC: NORMAL
PATH REPORT.FINAL DX SPEC: NORMAL
PATH REPORT.GROSS SPEC: NORMAL
PATH REPORT.MICROSCOPIC SPEC OTHER STN: NORMAL
PATH REPORT.RELEVANT HX SPEC: NORMAL

## 2023-02-13 ENCOUNTER — TELEPHONE (OUTPATIENT)
Dept: DERMATOLOGY | Facility: CLINIC | Age: 63
End: 2023-02-13
Payer: COMMERCIAL

## 2023-02-13 NOTE — TELEPHONE ENCOUNTER
Left message for pt to call [phone] and ask to speak with derm nurse for Dr. Rajan's message below about pathology results.    Seema POSEY RN  [facility]  [phone]

## 2023-02-13 NOTE — TELEPHONE ENCOUNTER
"----- Message from Juaquin Rajan MD sent at 2/13/2023 11:58 AM CST -----  A(1). Skin, right calf, shave:  -  Compound dysplastic nevus with moderate to severe atypia     Schedule excision      Atypical moles are unusual benign moles that may resemble melanoma. People who have them are at increased risk of developing single or multiple melanomas. The higher the number of these moles someone has, the higher the risk; those who have 10 or more have 12 times the risk of developing melanoma compared to the general population. Atypical nevi are found significantly more often in melanoma patients than in the general population.    While atypical moles are considered to be pre-cancerous (more likely to turn into melanoma than regular moles), not everyone who has atypical moles gets melanoma. In fact, most moles -- both ordinary and atypical ones -- never become cancerous. Thus the removal of all atypical nevi is unnecessary. In fact, most of the melanomas found on people with atypical moles arise from normal skin and not an atypical mole.    Although a physician bases the initial diagnosis of atypical moles on a physical examination, removing several moles and examining them under a microscope must confirm the diagnosis. This procedure, is called a biopsy.    A pathologist will examine the tissue under a microscope and make the precise diagnosis. Diagnosis by biopsy is not exact, and in difficult cases doctors may split 50/50 down the middle as to whether a mole is melanoma or benign. If the pathologist uses the term \"severely dysplastic\" or \"atypical melanocytic hyperplasia\" or offers a long descriptive narrative it means he really is concerned about melanoma, but does not want to call it that.    Most dermatologists usually recommend that all patients with these severely dysplastic moles have them removed. Also many dermatologists recommend removing \"moderate dysplasia\" moles, if the biopsy didn't get all of it. " "Those with \"mild dysplasia\" can usually be left alone or watched.    "

## 2023-02-14 NOTE — TELEPHONE ENCOUNTER
Called patient:  Patient notified and educated on test results   Mohs procedure explained- all questions answered  appointment scheduled- mohs packet mailed.     Thank you,  Lourdes STANLEY RN  Dermatology   [phone]

## 2023-02-14 NOTE — TELEPHONE ENCOUNTER
[facility] Call Center    Phone Message    May a detailed message be left on voicemail: yes     Reason for Call: Pt is returning call in regards to path results. Please call pt back on home phone [phone]. Thanks       Action Taken: Message routed to:  [facility] Derm    Travel Screening: Not Applicable

## 2023-02-22 ENCOUNTER — TELEPHONE (OUTPATIENT)
Dept: DERMATOLOGY | Facility: CLINIC | Age: 63
End: 2023-02-22
Payer: COMMERCIAL

## 2023-02-22 NOTE — TELEPHONE ENCOUNTER
[facility] Call Center    Phone Message    May a detailed message be left on voicemail: no     Reason for Call: Other: Pt, Sosa- Needs to reschedule Mohs- due to the Weather/ Call [phone]     Action Taken: Other: OX DERM    Travel Screening: Not Applicable

## 2023-03-07 NOTE — PROGRESS NOTES
Surgical Office Location:  [facility]  [address] [place] [id number]

## 2023-03-09 ENCOUNTER — OFFICE VISIT (OUTPATIENT)
Dept: DERMATOLOGY | Facility: CLINIC | Age: 63
End: 2023-03-09
Payer: COMMERCIAL

## 2023-03-09 DIAGNOSIS — D48.5 NEOPLASM OF UNCERTAIN BEHAVIOR OF SKIN: Primary | ICD-10-CM

## 2023-03-09 PROCEDURE — 11402 EXC TR-EXT B9+MARG 1.1-2 CM: CPT | Performed by: DERMATOLOGY

## 2023-03-09 PROCEDURE — 88342 IMHCHEM/IMCYTCHM 1ST ANTB: CPT | Performed by: DERMATOLOGY

## 2023-03-09 PROCEDURE — 88331 PATH CONSLTJ SURG 1 BLK 1SPC: CPT | Performed by: DERMATOLOGY

## 2023-03-09 ASSESSMENT — PAIN SCALES - GENERAL: PAINLEVEL: NO PAIN (0)

## 2023-03-09 NOTE — PATIENT INSTRUCTIONS
Open Wound Care     for right calf    No strenuous activity for 48 hours    Take Tylenol as needed for discomfort.                                                .         Do not drink alcoholic beverages for 48 hours.    Keep the pressure bandage in place for 24 hours. If the bandage becomes blood tinged or loose, reinforce it with gauze and tape.        (Refer to the reverse side of this page for management of bleeding).    Remove bandage in 24 hours and begin wound care as follows:     Clean area with tap water using a Q tip or gauze pad, (shower / bathe normally)  Dry wound with Q tip or gauze pad  Apply Aquaphor, Vaseline, Polysporin or Bacitracin Ointment with a Q tip  Do NOT use Neosporin Ointment   Cover the wound with a band-aid or nonstick gauze pad and paper tape.  Repeat wound care once a day until wound is completely healed.    It is an old wives tale that a wound heals better when it is exposed to air and allowed to dry out. The wound will heal faster with a better cosmetic result if it is kept moist with ointment and covered with a bandage.  Do not let the wound dry out.    Supplies Needed:                Qtips or gauze pads                Vaseline, Aquaphor, Polysporin Ointment or Bacitracin Ointment (NOT NEOSPORIN)                Bandaids or nonstick gauze pads and paper tape    Wound care kits and brown paper tape are available for purchase at   the pharmacy.    BLEEDING:    Use tightly rolled up gauze or cloth to apply direct pressure over the bandage for 20 minutes.  Reapply pressure for an additional 20 minutes if necessary  Call the office or go to the nearest emergency room if pressure fails to stop the bleeding.  Use additional gauze and tape to maintain pressure once the bleeding has stopped.  Begin wound care 24 hours after surgery as directed.                WOUND HEALING    One week after surgery a pink / red halo will form around the outside of the wound.   This is new skin.  The Flushing  of the wound will appear yellowish white and produce some drainage.  The pink halo will slowly migrate in toward the center of the wound until the wound is covered with new shiny pink skin.  There will be no more drainage when the wound is completely healed.  It will take six months to one year for the redness to fade.  The scar may be itchy, tight and sensitive to extreme temperatures for a year after the surgery.  Massaging the area several times a day for several minutes after the wound is completely healed will help the scar soften and normalize faster. Begin massage only after healing is complete.    In case of emergency call: Dr Rajan: [phone]     [facility]: [phone]  [facility]: [phone]

## 2023-03-09 NOTE — LETTER
3/9/2023         RE: Sosa Seay  [address] [facility] [address]        Dear Colleague,    Thank you for referring your patient, Sosa Seay, to the [facility]. Please see a copy of my visit note below.    Surgical Office Location:  [facility]  [address] [place] [id number]      Sosa Seay is an extremely pleasant 62 year old year old female patient here today for evaluation and managment of severe an on left calf.  Patient has no other skin complaints today.  Remainder of the HPI, Meds, PMH, Allergies, FH, and SH was reviewed in chart.      Past Medical History:   Diagnosis Date     Allergic rhinitis, cause unspecified 1995    Allergic rhinitis     Squamous cell carcinoma 04/2018    forehead       Past Surgical History:   Procedure Laterality Date     ZZC NONSPECIFIC PROCEDURE  1973    Reconstructive Surgery to Ears        Family History   Problem Relation Age of Onset     Depression Mother         suicide at age 59     Hypertension Father      Skin Cancer Father         NMSC     Chronic Obstructive Pulmonary Disease Father      Skin Cancer Paternal Grandmother         NMSC       Social History     Socioeconomic History     Marital status:      Spouse name: Elliott     Number of children: 0     Years of education: 16     Highest education level: Not on file   Occupational History     Occupation:      Comment: food shelf   Tobacco Use     Smoking status: Never     Smokeless tobacco: Never   Substance and Sexual Activity     Alcohol use: Yes     Comment: 3-4 glasses of wine per week     Drug use: No     Sexual activity: Yes     Partners: Male     Birth control/protection: Surgical     Comment: Vasectomy   Other Topics Concern     Parent/sibling w/ CABG, MI or angioplasty before 65F 55M? No   Social History Narrative     Not on file     Social Determinants of Health     Financial Resource Strain: Not on  file   Food Insecurity: Not on file   Transportation Needs: Not on file   Physical Activity: Not on file   Stress: Not on file   Social Connections: Not on file   Intimate Partner Violence: Not on file   Housing Stability: Not on file       Outpatient Encounter Medications as of 3/9/2023   Medication Sig Dispense Refill     CALCIUM + D PO None Entered       Misc Natural Products (OSTEO BI-FLEX/5-LOXIN ADVANCED PO) Generic version       MULTIPLE VITAMINS CAPS   OR 1 capsule qd        No facility-administered encounter medications on file as of 3/9/2023.             O:   NAD, WDWN, Alert & Oriented, Mood & Affect wnl, Vitals stable   Here today alone   General appearance normal   Vitals stable   Alert, oriented and in no acute distress     L calf 4mm scaly papule       Eyes: Conjunctivae/lids:Normal     ENT: Lips, buccal mucosa, tongue: normal    MSK:Normal    Cardiovascular: peripheral edema none    Pulm: Breathing Normal    Neuro/Psych: Orientation:Alert and Orientedx3 ; Mood/Affect:normal       MICRO:   L calf :Unremarkable epidermis, dermis and superficial subcutis.  No concerning areas for malignancy.     A/P:  1. L calf severe an   Atypical moles are unusual benign moles that may resemble melanoma. People who have them are at increased risk of developing single or multiple melanomas. The higher the number of these moles someone has, the higher the risk; those who have 10 or more have 12 times the risk of developing melanoma compared to the general population. Atypical nevi are found significantly more often in melanoma patients than in the general population.    While atypical moles are considered to be pre-cancerous (more likely to turn into melanoma than regular moles), not everyone who has atypical moles gets melanoma. In fact, most moles -- both ordinary and atypical ones -- never become cancerous. Thus the removal of all atypical nevi is unnecessary. In fact, most of the melanomas found on people with  "atypical moles arise from normal skin and not an atypical mole.    Although a physician bases the initial diagnosis of atypical moles on a physical examination, removing several moles and examining them under a microscope must confirm the diagnosis. This procedure, is called a biopsy.    A pathologist will examine the tissue under a microscope and make the precise diagnosis. Diagnosis by biopsy is not exact, and in difficult cases doctors may split 50/50 down the middle as to whether a mole is melanoma or benign. If the pathologist uses the term \"severely dysplastic\" or \"atypical melanocytic hyperplasia\" or offers a long descriptive narrative it means he really is concerned about melanoma, but does not want to call it that.    Most dermatologists usually recommend that all patients with these severely dysplastic moles have them removed. Also many dermatologists recommend removing \"moderate dysplasia\" moles, if the biopsy didn't get all of it. Those with \"mild dysplasia\" can usually be left alone or watched.  L calf  EXCISION OF Atypical nevus, Margins confirmed with FROZEN SECTIONS, MART1 stain AND Second intent: After thorough discussion of PGACAC, consent obtained, anesthesia and prep, the margins of the lesion were identified and an incision was made encompassing the lesion with 4mm margin. The incisions were made through the skin and down to and including the superficial subcutis.  The lesion was removed en bloc and submitted for frozen section pathologic review. Clear margins obtained (1.2cm).  MART1 stain performed one 1 section(s).    REPAIR SECOND INTENT: We discussed the options for wound management in full with the patient including risks/benefits/possible outcomes. Decision made to allow the wound to heal by second intention. EBL minimal; complications none; wound care routine.  The patient was discharged in good condition and will return in one month or prn for wound evaluation.     It was a pleasure " speaking to Sosa Seay today.  Previous clinic notes and pertinent laboratory tests were reviewed prior to Sosa Seay's visit.  Signs and Symptoms of skin cancer discussed with patient.  Patient encouraged to perform monthly skin exams.  UV precautions reviewed with patient.  Return to clinic 6 months        Again, thank you for allowing me to participate in the care of your patient.        Sincerely,        Juaquin Rajan MD

## 2023-03-09 NOTE — PROGRESS NOTES
Sosa Seay is an extremely pleasant 62 year old year old female patient here today for evaluation and managment of severe an on left calf.  Patient has no other skin complaints today.  Remainder of the HPI, Meds, PMH, Allergies, FH, and SH was reviewed in chart.      Past Medical History:   Diagnosis Date     Allergic rhinitis, cause unspecified 1995    Allergic rhinitis     Squamous cell carcinoma 04/2018    forehead       Past Surgical History:   Procedure Laterality Date     ZZC NONSPECIFIC PROCEDURE  1973    Reconstructive Surgery to Ears        Family History   Problem Relation Age of Onset     Depression Mother         suicide at age 59     Hypertension Father      Skin Cancer Father         NMSC     Chronic Obstructive Pulmonary Disease Father      Skin Cancer Paternal Grandmother         NMSC       Social History     Socioeconomic History     Marital status:      Spouse name: Elliott     Number of children: 0     Years of education: 16     Highest education level: Not on file   Occupational History     Occupation:      Comment: food shelf   Tobacco Use     Smoking status: Never     Smokeless tobacco: Never   Substance and Sexual Activity     Alcohol use: Yes     Comment: 3-4 glasses of wine per week     Drug use: No     Sexual activity: Yes     Partners: Male     Birth control/protection: Surgical     Comment: Vasectomy   Other Topics Concern     Parent/sibling w/ CABG, MI or angioplasty before 65F 55M? No   Social History Narrative     Not on file     Social Determinants of Health     Financial Resource Strain: Not on file   Food Insecurity: Not on file   Transportation Needs: Not on file   Physical Activity: Not on file   Stress: Not on file   Social Connections: Not on file   Intimate Partner Violence: Not on file   Housing Stability: Not on file       Outpatient Encounter Medications as of 3/9/2023   Medication Sig Dispense Refill     CALCIUM + D PO None Entered       Misc  Natural Products (OSTEO BI-FLEX/5-LOXIN ADVANCED PO) Generic version       MULTIPLE VITAMINS CAPS   OR 1 capsule qd        No facility-administered encounter medications on file as of 3/9/2023.             O:   NAD, WDWN, Alert & Oriented, Mood & Affect wnl, Vitals stable   Here today alone   General appearance normal   Vitals stable   Alert, oriented and in no acute distress     L calf 4mm scaly papule       Eyes: Conjunctivae/lids:Normal     ENT: Lips, buccal mucosa, tongue: normal    MSK:Normal    Cardiovascular: peripheral edema none    Pulm: Breathing Normal    Neuro/Psych: Orientation:Alert and Orientedx3 ; Mood/Affect:normal       MICRO:   L calf :Unremarkable epidermis, dermis and superficial subcutis.  No concerning areas for malignancy.     A/P:  1. L calf severe an   Atypical moles are unusual benign moles that may resemble melanoma. People who have them are at increased risk of developing single or multiple melanomas. The higher the number of these moles someone has, the higher the risk; those who have 10 or more have 12 times the risk of developing melanoma compared to the general population. Atypical nevi are found significantly more often in melanoma patients than in the general population.    While atypical moles are considered to be pre-cancerous (more likely to turn into melanoma than regular moles), not everyone who has atypical moles gets melanoma. In fact, most moles -- both ordinary and atypical ones -- never become cancerous. Thus the removal of all atypical nevi is unnecessary. In fact, most of the melanomas found on people with atypical moles arise from normal skin and not an atypical mole.    Although a physician bases the initial diagnosis of atypical moles on a physical examination, removing several moles and examining them under a microscope must confirm the diagnosis. This procedure, is called a biopsy.    A pathologist will examine the tissue under a microscope and make the precise  "diagnosis. Diagnosis by biopsy is not exact, and in difficult cases doctors may split 50/50 down the middle as to whether a mole is melanoma or benign. If the pathologist uses the term \"severely dysplastic\" or \"atypical melanocytic hyperplasia\" or offers a long descriptive narrative it means he really is concerned about melanoma, but does not want to call it that.    Most dermatologists usually recommend that all patients with these severely dysplastic moles have them removed. Also many dermatologists recommend removing \"moderate dysplasia\" moles, if the biopsy didn't get all of it. Those with \"mild dysplasia\" can usually be left alone or watched.  L calf  EXCISION OF Atypical nevus, Margins confirmed with FROZEN SECTIONS, MART1 stain AND Second intent: After thorough discussion of PGACAC, consent obtained, anesthesia and prep, the margins of the lesion were identified and an incision was made encompassing the lesion with 4mm margin. The incisions were made through the skin and down to and including the superficial subcutis.  The lesion was removed en bloc and submitted for frozen section pathologic review. Clear margins obtained (1.2cm).  MART1 stain performed one 1 section(s).    REPAIR SECOND INTENT: We discussed the options for wound management in full with the patient including risks/benefits/possible outcomes. Decision made to allow the wound to heal by second intention. EBL minimal; complications none; wound care routine.  The patient was discharged in good condition and will return in one month or prn for wound evaluation.     It was a pleasure speaking to Sosa Seay today.  Previous clinic notes and pertinent laboratory tests were reviewed prior to Sosa Seay's visit.  Signs and Symptoms of skin cancer discussed with patient.  Patient encouraged to perform monthly skin exams.  UV precautions reviewed with patient.  Return to clinic 6 months    "

## 2023-04-04 ENCOUNTER — TELEPHONE (OUTPATIENT)
Dept: DERMATOLOGY | Facility: CLINIC | Age: 63
End: 2023-04-04
Payer: COMMERCIAL

## 2023-04-04 NOTE — TELEPHONE ENCOUNTER
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 Please send to [phone]. Thanks!      Action Taken: Message routed to:  [facility] DERM    Travel Screening: Not Applicable

## 2023-04-04 NOTE — TELEPHONE ENCOUNTER
I sent in a request from them for the records to our record department last week. The clinic does not send out records, the records department does this.    Thank you,  Lourdes STANLEY RN  Dermatology   [phone]

## 2023-09-19 ENCOUNTER — OFFICE VISIT (OUTPATIENT)
Dept: DERMATOLOGY | Facility: CLINIC | Age: 63
End: 2023-09-19
Payer: COMMERCIAL

## 2023-09-19 DIAGNOSIS — Z85.828 HISTORY OF SCC (SQUAMOUS CELL CARCINOMA) OF SKIN: ICD-10-CM

## 2023-09-19 DIAGNOSIS — D18.01 ANGIOMA OF SKIN: ICD-10-CM

## 2023-09-19 DIAGNOSIS — L81.4 LENTIGO: ICD-10-CM

## 2023-09-19 DIAGNOSIS — Z87.898 HISTORY OF ATYPICAL NEVUS: ICD-10-CM

## 2023-09-19 DIAGNOSIS — D22.9 NEVUS: Primary | ICD-10-CM

## 2023-09-19 DIAGNOSIS — L82.1 SEBORRHEIC KERATOSIS: ICD-10-CM

## 2023-09-19 PROCEDURE — 99213 OFFICE O/P EST LOW 20 MIN: CPT | Performed by: PHYSICIAN ASSISTANT

## 2023-09-19 NOTE — PROGRESS NOTES
HPI:   Chief complaint: Sosa Seay is a 63 year old female who presents for Full skin cancer screening to rule out skin cancer.  Last Skin Exam: 6 mo ago     1st Baseline: no  Personal HX of Skin Cancer: SCC on R forehead 2018   Personal HX of Malignant Melanoma: none   Family HX of Skin Cancer / Malignant Melanoma: none  Personal HX of Atypical Moles: Yes severely dysplastic nevus on the right calf 2/2023  Risk factors: sun exposure  New / Changing lesions: none  Social History:  passed away in 2020 from cancer. Working from home -  for [employer]   On review of systems, there are no further skin complaints, patient is feeling otherwise well.  See patient intake sheet.  ROS of the following were done and are negative: Constitutional, Eyes, Ears, Nose,   Mouth, Throat, Cardiovascular, Respiratory, GI, Genitourinary, Musculoskeletal,   Psychiatric, Endocrine, Allergic/Immunologic.        PHYSICAL EXAM:   [facility] 01/04/2012   Skin exam performed as follows: Type 2 skin. Mood appropriate  Alert and Oriented X 3. Well developed, well nourished in no distress.  General appearance: Normal  Head including face: Normal  Eyes: conjunctiva and lids: Normal  Mouth: Lips, teeth, gums: Normal  Neck: Normal  Chest-breast/axillae: Normal  Back: Normal  Spleen and liver: Normal  Cardiovascular: Exam of peripheral vascular system by observation for swelling, varicosities, edema: Normal  Genitalia: groin, buttocks: Normal  Extremities: digits/nails (clubbing): Normal  Eccrine and Apocrine glands: Normal  Right upper extremity: Normal  Left upper extremity: Normal  Right lower extremity: Normal  Left lower extremity: Normal  Skin: Scalp and body hair: See below    Pt deferred exam of breasts, groin, buttocks: No    Other physical findings:  1. Multiple pigmented macules on extremities and trunk  2. Multiple pigmented macules on face, trunk and extremities  3. Multiple vascular papules on trunk, arms and legs  4.  Multiple scattered keratotic plaques         Except as noted above, no other signs of skin cancer or melanoma.     ASSESSMENT/PLAN:   Benign Full skin cancer screening today.     Patient with history of SCC on forehead 2018, severely dysplastic nevus on the right calf 2/2023  Advised on monthly self exams and 1 year  Patient Education: Appropriate brochures given.    Multiple benign appearing nevi on arms, legs and trunk. Discussed ABCDEs of melanoma and sunscreen.   Multiple lentigos on arms, legs and trunk. Advised benign, no treatment needed.  Multiple scattered angiomas. Advised benign, no treatment needed.   Seborrheic keratosis on arms, legs and trunk. Advised benign, no treatment needed.          Follow-up: yearly FSE/PRN sooner     1.) Patient was asked about new and changing moles. YES  2.) Patient received a complete physical skin examination: YES  3.) Patient was counseled to perform a monthly self skin examination: YES  Scribed By:  Becky Mao, MS, PA-C\

## 2023-09-19 NOTE — LETTER
9/19/2023         RE: Soas Seay  [address] [facility] [address]        Dear Colleague,    Thank you for referring your patient, Sosa Seay, to the [facility]. Please see a copy of my visit note below.    HPI:   Chief complaint: Sosa Seay is a 63 year old female who presents for Full skin cancer screening to rule out skin cancer.  Last Skin Exam: 6 mo ago     1st Baseline: no  Personal HX of Skin Cancer: SCC on R forehead 2018   Personal HX of Malignant Melanoma: none   Family HX of Skin Cancer / Malignant Melanoma: none  Personal HX of Atypical Moles: Yes severely dysplastic nevus on the right calf 2/2023  Risk factors: sun exposure  New / Changing lesions: none  Social History:  passed away in 2020 from cancer. Working from home -  for [employer]   On review of systems, there are no further skin complaints, patient is feeling otherwise well.  See patient intake sheet.  ROS of the following were done and are negative: Constitutional, Eyes, Ears, Nose,   Mouth, Throat, Cardiovascular, Respiratory, GI, Genitourinary, Musculoskeletal,   Psychiatric, Endocrine, Allergic/Immunologic.        PHYSICAL EXAM:   [facility] 01/04/2012   Skin exam performed as follows: Type 2 skin. Mood appropriate  Alert and Oriented X 3. Well developed, well nourished in no distress.  General appearance: Normal  Head including face: Normal  Eyes: conjunctiva and lids: Normal  Mouth: Lips, teeth, gums: Normal  Neck: Normal  Chest-breast/axillae: Normal  Back: Normal  Spleen and liver: Normal  Cardiovascular: Exam of peripheral vascular system by observation for swelling, varicosities, edema: Normal  Genitalia: groin, buttocks: Normal  Extremities: digits/nails (clubbing): Normal  Eccrine and Apocrine glands: Normal  Right upper extremity: Normal  Left upper extremity: Normal  Right lower extremity: Normal  Left lower extremity: Normal  Skin: Scalp and body  hair: See below    Pt deferred exam of breasts, groin, buttocks: No    Other physical findings:  1. Multiple pigmented macules on extremities and trunk  2. Multiple pigmented macules on face, trunk and extremities  3. Multiple vascular papules on trunk, arms and legs  4. Multiple scattered keratotic plaques         Except as noted above, no other signs of skin cancer or melanoma.     ASSESSMENT/PLAN:   Benign Full skin cancer screening today.     Patient with history of SCC on forehead 2018, severely dysplastic nevus on the right calf 2/2023  Advised on monthly self exams and 1 year  Patient Education: Appropriate brochures given.    Multiple benign appearing nevi on arms, legs and trunk. Discussed ABCDEs of melanoma and sunscreen.   Multiple lentigos on arms, legs and trunk. Advised benign, no treatment needed.  Multiple scattered angiomas. Advised benign, no treatment needed.   Seborrheic keratosis on arms, legs and trunk. Advised benign, no treatment needed.          Follow-up: yearly FSE/PRN sooner     1.) Patient was asked about new and changing moles. YES  2.) Patient received a complete physical skin examination: YES  3.) Patient was counseled to perform a monthly self skin examination: YES  Scribed By:  Becky Mao, MS, ABBIE\      Again, thank you for allowing me to participate in the care of your patient.        Sincerely,        Becky Mao PA-C

## 2024-05-13 ENCOUNTER — ANCILLARY PROCEDURE (OUTPATIENT)
Dept: MAMMOGRAPHY | Facility: CLINIC | Age: 64
End: 2024-05-13
Attending: NURSE PRACTITIONER
Payer: COMMERCIAL

## 2024-05-13 DIAGNOSIS — Z12.31 VISIT FOR SCREENING MAMMOGRAM: ICD-10-CM

## 2024-05-13 PROCEDURE — 77067 SCR MAMMO BI INCL CAD: CPT | Mod: TC | Performed by: RADIOLOGY

## 2024-09-24 ENCOUNTER — OFFICE VISIT (OUTPATIENT)
Dept: DERMATOLOGY | Facility: CLINIC | Age: 64
End: 2024-09-24
Payer: COMMERCIAL

## 2024-09-24 DIAGNOSIS — L82.1 SEBORRHEIC KERATOSIS: ICD-10-CM

## 2024-09-24 DIAGNOSIS — Z87.898 HISTORY OF ATYPICAL NEVUS: ICD-10-CM

## 2024-09-24 DIAGNOSIS — D22.9 NEVUS: Primary | ICD-10-CM

## 2024-09-24 DIAGNOSIS — Z85.828 HISTORY OF SCC (SQUAMOUS CELL CARCINOMA) OF SKIN: ICD-10-CM

## 2024-09-24 DIAGNOSIS — L81.4 LENTIGO: ICD-10-CM

## 2024-09-24 DIAGNOSIS — D18.01 ANGIOMA OF SKIN: ICD-10-CM

## 2024-09-24 PROCEDURE — G2211 COMPLEX E/M VISIT ADD ON: HCPCS | Performed by: PHYSICIAN ASSISTANT

## 2024-09-24 PROCEDURE — 99213 OFFICE O/P EST LOW 20 MIN: CPT | Performed by: PHYSICIAN ASSISTANT

## 2024-09-24 ASSESSMENT — PAIN SCALES - GENERAL: PAINLEVEL: NO PAIN (0)

## 2024-09-24 NOTE — PATIENT INSTRUCTIONS
Proper skin care from [facility]:    -Eliminate harsh soaps as they strip the natural oils from the skin, often resulting in dry itchy skin ( i.e. Dial, Zest, Moroccan Spring)  -Use mild soaps such as Cetaphil or Dove Sensitive Skin in the shower. You do not need to use soap on arms, legs, and trunk every time you shower unless visibly soiled.   -Avoid hot or cold showers.  -After showering, lightly dry off and apply moisturizing within 2-3 minutes. This will help trap moisture in the skin.   -Aggressive use of a moisturizer at least 1-2 times a day to the entire body (including -Vanicream, Cetaphil, Aquaphor or Cerave) and moisturize hands after every washing.  -We recommend using moisturizers that come in a tub that needs to be scooped out, not a pump. This has more of an oil base. It will hold moisture in your skin much better than a water base moisturizer. The above recommended are non-pore clogging.      Wear a sunscreen with at least SPF 30 on your face, ears, neck and V of the chest daily. Wear sunscreen on other areas of the body if those areas are exposed to the sun throughout the day. Sunscreens can contain physical and/or chemical blockers. Physical blockers are less likely to clog pores, these include zinc oxide and titanium dioxide. Reapply every two hour and after swimming.     Sunscreen examples: https://www.ewg.org/sunscreen/    UV radiation  UVA radiation remains constant throughout the day and throughout the year. It is a longer wavelength than UVB and therefore penetrates deeper into the skin leading to immediate and delayed tanning, photoaging, and skin cancer. 70-80% of UVA and UVB radiation occurs between the hours of 10am-2pm.  UVB radiation  UVB radiation causes the most harmful effects and is more significant during the summer months. However, snow and ice can reflect UVB radiation leading to skin damage during the winter months as well. UVB radiation is responsible for tanning,  burning, inflammation, delayed erythema (pinkness), pigmentation (brown spots), and skin cancer.     I recommend self monthly full body exams and yearly full body exams with a dermatology provider. If you develop a new or changing lesion please follow up for examination. Most skin cancers are pink and scaly or pink and pearly. However, we do see blue/brown/black skin cancers.  Consider the ABCDEs of melanoma when giving yourself your monthly full body exam ( don't forget the groin, buttocks, feet, toes, etc). A-asymmetry, B-borders, C-color, D-diameter, E-elevation or evolving. If you see any of these changes please follow up in clinic. If you cannot see your back I recommend purchasing a hand held mirror to use with a larger wall mirror.       Checking for Skin Cancer  You can find cancer early by checking your skin each month. There are 3 kinds of skin cancer. They are melanoma, basal cell carcinoma, and squamous cell carcinoma. Doing monthly skin checks is the best way to find new marks or skin changes. Follow the instructions below for checking your skin.   The ABCDEs of checking moles for melanoma   Check your moles or growths for signs of melanoma using ABCDE:   Asymmetry: the sides of the mole or growth don t match  Border: the edges are ragged, notched, or blurred  Color: the color within the mole or growth varies  Diameter: the mole or growth is larger than 6 mm (size of a pencil eraser)  Evolving: the size, shape, or color of the mole or growth is changing (evolving is not shown in the images below)    Checking for other types of skin cancer  Basal cell carcinoma or squamous cell carcinoma have symptoms such as:     A spot or mole that looks different from all other marks on your skin  Changes in how an area feels, such as itching, tenderness, or pain  Changes in the skin's surface, such as oozing, bleeding, or scaliness  A sore that does not heal  New swelling or redness beyond the border of a  mole    Who s at risk?  Anyone can get skin cancer. But you are at greater risk if you have:   Fair skin, light-colored hair, or light-colored eyes  Many moles or abnormal moles on your skin  A history of sunburns from sunlight or tanning beds  A family history of skin cancer  A history of exposure to radiation or chemicals  A weakened immune system  If you have had skin cancer in the past, you are at risk for recurring skin cancer.   How to check your skin  Do your monthly skin checkups in front of a full-length mirror. Check all parts of your body, including your:   Head (ears, face, neck, and scalp)  Torso (front, back, and sides)  Arms (tops, undersides, upper, and lower armpits)  Hands (palms, backs, and fingers, including under the nails)  Buttocks and genitals  Legs (front, back, and sides)  Feet (tops, soles, toes, including under the nails, and between toes)  If you have a lot of moles, take digital photos of them each month. Make sure to take photos both up close and from a distance. These can help you see if any moles change over time.   Most skin changes are not cancer. But if you see any changes in your skin, call your doctor right away. Only he or she can diagnose a problem. If you have skin cancer, seeing your doctor can be the first step toward getting the treatment that could save your life.   [employer] last reviewed this educational content on 4/1/2019 2000-2020 [employer]. [address] [place] [address], [place] [address]. All rights reserved. This information is not intended as a substitute for professional medical care. Always follow your healthcare professional's instructions.       When should I call my doctor?  If you are worsening or not improving, please, contact us or seek urgent care as noted below.     Who should I call with questions (adults)?    [facility] [phone]  For urgent needs outside of business hours call the [facility] at  [phone] and ask for the dermatology resident on call to be paged  If this is a medical emergency and you are unable to reach an ER, Call [phone]      If you need a prescription refill, please contact your pharmacy. Refills are approved or denied by our Physicians during normal business hours, Monday through Fridays  Per office policy, refills will not be granted if you have not been seen within the past year (or sooner depending on your child's condition)

## 2024-09-24 NOTE — PROGRESS NOTES
HPI:   Chief complaint: Sosa Seay is a 64 year old female who presents for Full skin cancer screening to rule out skin cancer.  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 Working from home -  for [employer]   On review of systems, there are no further skin complaints, patient is feeling otherwise well.  See patient intake sheet.  ROS of the following were done and are negative: Constitutional, Eyes, Ears, Nose,   Mouth, Throat, Cardiovascular, Respiratory, GI, Genitourinary, Musculoskeletal,   Psychiatric, Endocrine, Allergic/Immunologic.        PHYSICAL EXAM:   [facility] 01/04/2012   Skin exam performed as follows: Type 2 skin. Mood appropriate  Alert and Oriented X 3. Well developed, well nourished in no distress.  General appearance: Normal  Head including face: Normal  Eyes: conjunctiva and lids: Normal  Mouth: Lips, teeth, gums: Normal  Neck: Normal  Chest-breast/axillae: Normal  Back: Normal  Spleen and liver: Normal  Cardiovascular: Exam of peripheral vascular system by observation for swelling, varicosities, edema: Normal  Genitalia: groin, buttocks: Normal  Extremities: digits/nails (clubbing): Normal  Eccrine and Apocrine glands: Normal  Right upper extremity: Normal  Left upper extremity: Normal  Right lower extremity: Normal  Left lower extremity: Normal  Skin: Scalp and body hair: See below    Pt deferred exam of breasts, groin, buttocks: No    Other physical findings:  1. Multiple pigmented macules on extremities and trunk  2. Multiple pigmented macules on face, trunk and extremities  3. Multiple vascular papules on trunk, arms  and legs  4. Multiple scattered keratotic plaques         Except as noted above, no other signs of skin cancer or melanoma.     ASSESSMENT/PLAN:   Benign Full skin cancer screening today.     Patient with history of SCC on forehead 2018, severely atypical nevus  Advised on monthly self exams and 1 year  Patient Education: Appropriate brochures given.    Multiple benign appearing nevi on arms, legs and trunk. Discussed ABCDEs of melanoma and sunscreen.   Multiple lentigos on arms, legs and trunk. Advised benign, no treatment needed.  Multiple scattered angiomas. Advised benign, no treatment needed.   Seborrheic keratosis on arms, legs and trunk. Advised benign, no treatment needed.          Follow-up: yearly FSE/PRN sooner     1.) Patient was asked about new and changing moles. YES  2.) Patient received a complete physical skin examination: YES  3.) Patient was counseled to perform a monthly self skin examination: YES  Scribed By:  Becky Mao, MS, PAELLEN

## 2024-09-24 NOTE — LETTER
9/24/2024      Sosa Seay  [id number] [address] [facility] [id number]      Dear Colleague,    Thank you for referring your patient, Sosa Seay, to the [facility]. Please see a copy of my visit note below.    HPI:   Chief complaint: Sosa Seay is a 64 year old female who presents for Full skin cancer screening to rule out skin cancer.  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 Working from home -  for [employer]   On review of systems, there are no further skin complaints, patient is feeling otherwise well.  See patient intake sheet.  ROS of the following were done and are negative: Constitutional, Eyes, Ears, Nose,   Mouth, Throat, Cardiovascular, Respiratory, GI, Genitourinary, Musculoskeletal,   Psychiatric, Endocrine, Allergic/Immunologic.        PHYSICAL EXAM:   [facility] 01/04/2012   Skin exam performed as follows: Type 2 skin. Mood appropriate  Alert and Oriented X 3. Well developed, well nourished in no distress.  General appearance: Normal  Head including face: Normal  Eyes: conjunctiva and lids: Normal  Mouth: Lips, teeth, gums: Normal  Neck: Normal  Chest-breast/axillae: Normal  Back: Normal  Spleen and liver: Normal  Cardiovascular: Exam of peripheral vascular system by observation for swelling, varicosities, edema: Normal  Genitalia: groin, buttocks: Normal  Extremities: digits/nails (clubbing): Normal  Eccrine and Apocrine glands: Normal  Right upper extremity: Normal  Left upper extremity: Normal  Right lower extremity: Normal  Left lower extremity: Normal  Skin: Scalp and body  hair: See below    Pt deferred exam of breasts, groin, buttocks: No    Other physical findings:  1. Multiple pigmented macules on extremities and trunk  2. Multiple pigmented macules on face, trunk and extremities  3. Multiple vascular papules on trunk, arms and legs  4. Multiple scattered keratotic plaques         Except as noted above, no other signs of skin cancer or melanoma.     ASSESSMENT/PLAN:   Benign Full skin cancer screening today.     Patient with history of SCC on forehead 2018, severely atypical nevus  Advised on monthly self exams and 1 year  Patient Education: Appropriate brochures given.    Multiple benign appearing nevi on arms, legs and trunk. Discussed ABCDEs of melanoma and sunscreen.   Multiple lentigos on arms, legs and trunk. Advised benign, no treatment needed.  Multiple scattered angiomas. Advised benign, no treatment needed.   Seborrheic keratosis on arms, legs and trunk. Advised benign, no treatment needed.          Follow-up: yearly FSE/PRN sooner     1.) Patient was asked about new and changing moles. YES  2.) Patient received a complete physical skin examination: YES  3.) Patient was counseled to perform a monthly self skin examination: YES  Scribed By:  Becky Mao, MS, PAELLEN      Again, thank you for allowing me to participate in the care of your patient.        Sincerely,        Becky Mao PA-C

## 2025-04-01 ENCOUNTER — DOCUMENTATION ONLY (OUTPATIENT)
Dept: OTHER | Facility: CLINIC | Age: 65
End: 2025-04-01
Payer: COMMERCIAL